# Patient Record
Sex: FEMALE | Race: WHITE | Employment: FULL TIME | URBAN - METROPOLITAN AREA
[De-identification: names, ages, dates, MRNs, and addresses within clinical notes are randomized per-mention and may not be internally consistent; named-entity substitution may affect disease eponyms.]

---

## 2017-02-09 ENCOUNTER — OFFICE VISIT (OUTPATIENT)
Dept: FAMILY MEDICINE CLINIC | Facility: CLINIC | Age: 32
End: 2017-02-09

## 2017-02-09 VITALS
BODY MASS INDEX: 21.85 KG/M2 | HEART RATE: 88 BPM | HEIGHT: 64 IN | SYSTOLIC BLOOD PRESSURE: 110 MMHG | WEIGHT: 128 LBS | DIASTOLIC BLOOD PRESSURE: 70 MMHG

## 2017-02-09 DIAGNOSIS — Z00.00 WELL FEMALE EXAM WITHOUT GYNECOLOGICAL EXAM: Primary | ICD-10-CM

## 2017-02-09 PROCEDURE — 99385 PREV VISIT NEW AGE 18-39: CPT | Performed by: FAMILY MEDICINE

## 2017-02-09 NOTE — PROGRESS NOTES
HPI:   Leonardo Becerra is a 29year old female who presents for a complete physical exam. Symptoms: no periods is breast feeding. .   Abnormal pap no abnormal  Sexually active libido down    Last colonoscopy FH GF colon cancer  Last mammogram No FH breast or ov pain  CARDIOVASCULAR: denies chest pain or tightness on exertion: no edema  VASCULAR: denies leg cramps  GI: denies abdominal pain, bowel movement changes, blood in stool  : denies urinary problems, vaginal discharge or discomfort,   MUSCULOSKELETAL: den explained. Health maintenance guidance given including vision and dental exams, vitamin D 1,000 iu daily with calcium 1,500 mg daily. Lifestyle guidance provided recommended low fat diet and aerobic exercise 30 minutes 3-4 times weekly.   The patient indic

## 2017-02-09 NOTE — PATIENT INSTRUCTIONS
SCHEDULING [facility] APPOINTMENTS ONLINE    Lab appointments can now be scheduled online at [email or web address]    · Go to [email or web address]  · In Search type Lab  · Click \"Lab services\"  · Click \"Schedule Your Test Online\"  · Follow the prompts  · If you should have a mammogram.   Pap test: at least every 3 years if you have ever had sex and have not had your uterus removed.  Your healthcare provider may recommend more frequent screening if you have had any abnormalities in the past or if you have an increa do not get regular exercise). Osteoporosis is a disease that thins and weakens bones to the point where they break easily. Hearing test: if you are 72 or older   Vision test: if you are 72 or older.    Remember, these are the minimum recommendations for r hepatitis shot and for all adults who are at risk of infection.  This includes, for example, women who have more than 1 sex partner or whose partner has more than 1 partner, or who have a sexually transmitted infection, abuse IV drugs, or plan to travel whe partners. Hormone use: During or after menopause, discuss the risks and benefits of use of estrogen and progesterone replacement with your healthcare provider.    Osteoporosis prevention:  Advise 1,500 mg of calcium with 1,000 iu of vitamin D daily and da

## 2017-03-02 ENCOUNTER — OFFICE VISIT (OUTPATIENT)
Dept: FAMILY MEDICINE CLINIC | Facility: CLINIC | Age: 32
End: 2017-03-02

## 2017-03-02 ENCOUNTER — TELEPHONE (OUTPATIENT)
Dept: FAMILY MEDICINE CLINIC | Facility: CLINIC | Age: 32
End: 2017-03-02

## 2017-03-02 VITALS
HEART RATE: 80 BPM | BODY MASS INDEX: 21.85 KG/M2 | OXYGEN SATURATION: 98 % | SYSTOLIC BLOOD PRESSURE: 114 MMHG | RESPIRATION RATE: 20 BRPM | HEIGHT: 64 IN | TEMPERATURE: 98 F | DIASTOLIC BLOOD PRESSURE: 72 MMHG | WEIGHT: 128 LBS

## 2017-03-02 DIAGNOSIS — L30.9 DERMATITIS: ICD-10-CM

## 2017-03-02 PROCEDURE — 99213 OFFICE O/P EST LOW 20 MIN: CPT | Performed by: NURSE PRACTITIONER

## 2017-03-02 NOTE — PROGRESS NOTES
CHIEF COMPLAINT:   Patient presents with:  Derm Problem: ear lobes and face feel itchy and red x 5 days, lice exposure 2 weeks ago. HPI:   Radha Long is a 29year old female who presents for evaluation of a rash.   Per patient rash started in HEENT: Denies rhinorrhea, edema of the lips or swelling of throat. CARDIOVASCULAR: Denies chest pains or palpitations. LUNGS: Denies shortness of breath with exertion or rest. No cough or wheezing. LYMPH: Denies enlargement of the lymph nodes.   MUSC/SKE Hortencia García is a 29year old female who presents for evaluation of a rash. Findings are consistent with:    ASSESSMENT:  Dermatitis    PLAN: Patiently is currently breast feeding. OTC Meds and instructions as listed below.   Comfort measures as described Atopic dermatitis symptoms can appear anywhere on the body. But, in most cases, they vary based on the patient’s age. In infants, irritation appears frequently on the cheeks, chin, near the mouth, and under the eyelids.  In children ages 3 through 8, skin

## 2017-03-02 NOTE — PATIENT INSTRUCTIONS
Try Claritin for itch as packet insert  Look for eczema shampoos for kids, check label. May try selsium blue shampoo weekly. Can pump and dump the day used. May try emollient lotions to base of scalp, ear lobes and under eyes.   Hydrocortisone 1 % to To pinpoint what causes atopic dermatitis to flare, keep a list of factors that seem to affect your skin. Start by filling in the spaces below. Then, keep writing them down in a notebook or diary. The factors that affect each person vary.  So, keep your own

## 2017-03-02 NOTE — TELEPHONE ENCOUNTER
Pt called and said she has extreme itching on her ears and face. She would like to come in but [facility] is booked. Is there something she can take to help the itching?

## 2017-03-02 NOTE — TELEPHONE ENCOUNTER
Patient advised that she could use benadryl or a hydrocortisone cream for the itching.   Did recommend that patient be seen at one of our walk-in clinics for evaluation

## 2017-03-21 ENCOUNTER — OFFICE VISIT (OUTPATIENT)
Dept: FAMILY MEDICINE CLINIC | Facility: CLINIC | Age: 32
End: 2017-03-21

## 2017-03-21 VITALS
SYSTOLIC BLOOD PRESSURE: 118 MMHG | WEIGHT: 121 LBS | BODY MASS INDEX: 20.66 KG/M2 | HEART RATE: 84 BPM | TEMPERATURE: 99 F | HEIGHT: 64 IN | DIASTOLIC BLOOD PRESSURE: 80 MMHG

## 2017-03-21 DIAGNOSIS — N89.8 VAGINAL DISCHARGE: ICD-10-CM

## 2017-03-21 DIAGNOSIS — R23.8 EASY BRUISING: Primary | ICD-10-CM

## 2017-03-21 LAB
CLUE CELL EXAM: NEGATIVE
MOVING ELEMENTS: NEGATIVE
WBC: NEGATIVE
WHIFF TEST: NEGATIVE

## 2017-03-21 PROCEDURE — 99214 OFFICE O/P EST MOD 30 MIN: CPT | Performed by: FAMILY MEDICINE

## 2017-03-21 PROCEDURE — 87210 SMEAR WET MOUNT SALINE/INK: CPT | Performed by: FAMILY MEDICINE

## 2017-03-21 RX ORDER — FLUCONAZOLE 150 MG/1
150 TABLET ORAL ONCE
Qty: 2 TABLET | Refills: 0 | Status: SHIPPED | OUTPATIENT
Start: 2017-03-21 | End: 2017-03-21

## 2017-03-21 NOTE — PROGRESS NOTES
Laura Medina is a 29year old female. HPI:   Has had a yeast infection for the past month. Has treated twice with OTC Monostat. Has been prone to yeast infections in the past. Started taking probiotics but is not consistent with taking.  Also feels like p normal to palpation  LUNGS: clear to auscultation no rales, rhonchi or wheezes  CARDIO: RRR without murmur  GI: Normal bowel sounds ×4 quadrant, no hepatosplenomegaly or masses, and no tenderness   external labia erythema no lesions vagina also erythema are consistent with candidiasis. Probiotic daily, Diflucan 150 mg 1 p.o. today repeat in 3 days. Advised to keep area dry use blow dryer after shower.   Be consistent with probiotic.  - SURESWAB(R), VAGINOSIS/VAGINITIS PLUS [[id number]][Q]  Discussed about the

## 2017-03-21 NOTE — PATIENT INSTRUCTIONS
SCHEDULING [facility] APPOINTMENTS ONLINE    Lab appointments can now be scheduled online at [email or web address]    · Go to [email or web address]  · In Search type Lab  · Click \"Lab services\"  · Click \"Schedule Your Test Online\"  · Follow the prompts  · If you Debra Franklin even more effective. Visit your healthcare provider if you have difficulties doing Kegel exercises. Helpful hints  Here are some tips to follow:  · Do your [place] as often as you can. The more you do them, the faster you’ll feel the results.   · Monty Reach since the cream may weaken them. · Avoid intercourse if advised by your healthcare provider.     Should I treat a yeast infection myself? Discuss with your healthcare provider whether you should use over-the-counter medicines to treat a yeast infection.

## 2017-03-22 PROBLEM — R23.8 EASY BRUISING: Status: ACTIVE | Noted: 2017-03-22

## 2017-03-22 PROBLEM — R23.3 EASY BRUISING: Status: ACTIVE | Noted: 2017-03-22

## 2017-03-24 ENCOUNTER — TELEPHONE (OUTPATIENT)
Dept: FAMILY MEDICINE CLINIC | Facility: CLINIC | Age: 32
End: 2017-03-24

## 2017-03-24 LAB
ATOPOBIUM VAGINAE: NOT DETECTED LOG CELLS/ML
C. ALBICANS, DNA: NOT DETECTED
C. GLABRATA, DNA: NOT DETECTED
C. PARAPSILOSIS, DNA: NOT DETECTED
C. TROPICALIS, DNA: NOT DETECTED
CHLAMYDIA TRACHOMATIS$RNA, TMA: NOT DETECTED
GARDNERELLA VAGINALIS: NOT DETECTED LOG CELLS/ML
LACTOBACILLUS SPECIES: 5.1 LOG CELLS/ML
MEGASPHAERA SPECIES: NOT DETECTED LOG CELLS/ML
NEISSERIA GONORRHOEAE$RNA, TMA: NOT DETECTED
SURESWAB(R) TRICHOMONAS$VAGINALIS RNA, QL TMA: NOT DETECTED

## 2017-03-24 NOTE — TELEPHONE ENCOUNTER
----- Message from Tequila Acosta PA-C sent at 3/24/2017  9:47 AM CDT -----  Vaginosis panel is negative

## 2017-03-24 NOTE — TELEPHONE ENCOUNTER
lmom for pt (secure ) with results. Asked pt after reviewing message to call office with any questions.

## 2017-06-20 ENCOUNTER — OFFICE VISIT (OUTPATIENT)
Dept: FAMILY MEDICINE CLINIC | Facility: CLINIC | Age: 32
End: 2017-06-20

## 2017-06-20 VITALS
SYSTOLIC BLOOD PRESSURE: 112 MMHG | WEIGHT: 115.19 LBS | DIASTOLIC BLOOD PRESSURE: 68 MMHG | HEART RATE: 84 BPM | BODY MASS INDEX: 20 KG/M2

## 2017-06-20 DIAGNOSIS — M62.08 DIASTASIS RECTI: ICD-10-CM

## 2017-06-20 DIAGNOSIS — Z63.79 STRESSFUL LIFE EVENT AFFECTING FAMILY: ICD-10-CM

## 2017-06-20 DIAGNOSIS — Z30.011 ENCOUNTER FOR INITIAL PRESCRIPTION OF CONTRACEPTIVE PILLS: Primary | ICD-10-CM

## 2017-06-20 PROBLEM — R23.8 EASY BRUISING: Status: RESOLVED | Noted: 2017-03-22 | Resolved: 2017-06-20

## 2017-06-20 PROBLEM — Z00.00 WELL FEMALE EXAM WITHOUT GYNECOLOGICAL EXAM: Status: RESOLVED | Noted: 2017-02-09 | Resolved: 2017-06-20

## 2017-06-20 PROBLEM — R23.3 EASY BRUISING: Status: RESOLVED | Noted: 2017-03-22 | Resolved: 2017-06-20

## 2017-06-20 PROCEDURE — 99213 OFFICE O/P EST LOW 20 MIN: CPT | Performed by: FAMILY MEDICINE

## 2017-06-20 RX ORDER — FLUCONAZOLE 150 MG/1
TABLET ORAL
COMMUNITY
Start: 2017-06-07 | End: 2017-06-20 | Stop reason: ALTCHOICE

## 2017-06-20 RX ORDER — ACETAMINOPHEN AND CODEINE PHOSPHATE 120; 12 MG/5ML; MG/5ML
SOLUTION ORAL
Qty: 28 TABLET | Refills: 11 | Status: SHIPPED | OUTPATIENT
Start: 2017-06-20 | End: 2017-12-05

## 2017-06-20 NOTE — PROGRESS NOTES
Yarelis Ponce is a 29year old female. HPI:   Period just finished is nursing till baby is one years old. No prior issue with birth control uses. No history of clots but thinks her sister had a clot in her leg. No Breast issues is nursing.   Going throu heartburn  NEURO: denies headaches  Musculoskeletal: No motor deficits  EXAM:   /68 mmHg  Pulse 84  Wt 115 lb 3.2 oz  GENERAL: well developed, well nourished,in no apparent distress  SKIN: no rashes,no suspicious lesions  NECK: supple,no adenopathy,

## 2017-06-20 NOTE — PATIENT INSTRUCTIONS
SCHEDULING [facility] APPOINTMENTS ONLINE    Lab appointments can now be scheduled online at [email or web address]    · Go to [email or web address]  · In Search type Lab  · Click \"Lab services\"  · Click \"Schedule Your Test Online\"  · Follow the prompts  · If you swelling  · headache  · irritability  · nausea  · weight gain or loss  What may interact with this medicine?   Do not take this medicine with any of the following medications:  · amprenavir or fosamprenavir  · bosentan  This medicine may also interact with medicine? Visit your doctor or health care professional for regular checks on your progress. You will need a regular breast and pelvic exam and Pap smear while on this medicine.   Use an additional method of birth control during the first cycle that you ta health care provider to prevent pregnancy. The pill  The birth control pill is taken daily. It contains hormones that stop a woman’s body from releasing an egg each month.   Other hormones  Hormones that stop a woman’s egg from being released each month ca sexually transmitted infections (STIs). To protect against STIs, always use a latex condom. If you are allergic to latex, a non-latex condom may provide some protection.    Date Last Reviewed: 5/10/2015  © 1915-1902 [address]

## 2017-09-26 ENCOUNTER — OFFICE VISIT (OUTPATIENT)
Dept: FAMILY MEDICINE CLINIC | Facility: CLINIC | Age: 32
End: 2017-09-26

## 2017-09-26 VITALS
BODY MASS INDEX: 19.18 KG/M2 | HEART RATE: 78 BPM | DIASTOLIC BLOOD PRESSURE: 60 MMHG | OXYGEN SATURATION: 99 % | RESPIRATION RATE: 16 BRPM | HEIGHT: 64 IN | SYSTOLIC BLOOD PRESSURE: 106 MMHG | WEIGHT: 112.38 LBS | TEMPERATURE: 98 F

## 2017-09-26 DIAGNOSIS — Z00.00 WELL ADULT EXAM: Primary | ICD-10-CM

## 2017-09-26 DIAGNOSIS — Z11.1 SCREENING FOR TUBERCULOSIS: ICD-10-CM

## 2017-09-26 PROBLEM — Z30.011 ENCOUNTER FOR INITIAL PRESCRIPTION OF CONTRACEPTIVE PILLS: Status: RESOLVED | Noted: 2017-06-20 | Resolved: 2017-09-26

## 2017-09-26 PROCEDURE — 86580 TB INTRADERMAL TEST: CPT | Performed by: FAMILY MEDICINE

## 2017-09-26 PROCEDURE — 99395 PREV VISIT EST AGE 18-39: CPT | Performed by: FAMILY MEDICINE

## 2017-09-26 NOTE — PROGRESS NOTES
HPI:   Yarelis Ponce is a 29year old female who presents for a complete physical exam. Symptoms: periods are regular.  Still nursing at night   Abnormal pap done 1 year ago with gyne normal per patient  Sexually active yes   Sleep or emotional difficulty n Onset   • Hypertension Father    • Heart Attack Father      lates 42's - stent   • Cancer Paternal Grandfather      Lung   • Colon Cancer Maternal Grandfather    • Diabetes Maternal Grandfather    • Cancer Maternal Grandmother      Lung      Social History supple,no adenopathy,no carotid bruits  CHEST: no chest tenderness  LUNGS: clear to auscultation bilateral, no rales, rhonchi or wheezing  CARDIO: RRR without murmur normal S1S2  ABD:  normal bowel sounds,soft, non tender, no masses, HSM or tenderness  MUS

## 2017-12-05 ENCOUNTER — OFFICE VISIT (OUTPATIENT)
Dept: FAMILY MEDICINE CLINIC | Facility: CLINIC | Age: 32
End: 2017-12-05

## 2017-12-05 VITALS
DIASTOLIC BLOOD PRESSURE: 62 MMHG | HEART RATE: 84 BPM | WEIGHT: 110 LBS | BODY MASS INDEX: 18.78 KG/M2 | HEIGHT: 64.25 IN | SYSTOLIC BLOOD PRESSURE: 90 MMHG

## 2017-12-05 DIAGNOSIS — Z12.4 SCREENING FOR MALIGNANT NEOPLASM OF CERVIX: ICD-10-CM

## 2017-12-05 DIAGNOSIS — Z30.8 ENCOUNTER FOR OTHER CONTRACEPTIVE MANAGEMENT: ICD-10-CM

## 2017-12-05 DIAGNOSIS — N89.8 VAGINAL ITCHING: ICD-10-CM

## 2017-12-05 DIAGNOSIS — L50.9 HIVES: Primary | ICD-10-CM

## 2017-12-05 DIAGNOSIS — Z91.018 ALLERGY TO MANGO FRUIT: ICD-10-CM

## 2017-12-05 PROBLEM — Z63.79 STRESSFUL LIFE EVENT AFFECTING FAMILY: Status: RESOLVED | Noted: 2017-06-20 | Resolved: 2017-12-05

## 2017-12-05 PROCEDURE — 99214 OFFICE O/P EST MOD 30 MIN: CPT | Performed by: FAMILY MEDICINE

## 2017-12-05 PROCEDURE — 99000 SPECIMEN HANDLING OFFICE-LAB: CPT | Performed by: FAMILY MEDICINE

## 2017-12-05 PROCEDURE — 87625 HPV TYPES 16 & 18 ONLY: CPT | Performed by: FAMILY MEDICINE

## 2017-12-05 PROCEDURE — 87624 HPV HI-RISK TYP POOLED RSLT: CPT | Performed by: FAMILY MEDICINE

## 2017-12-05 PROCEDURE — 88175 CYTOPATH C/V AUTO FLUID REDO: CPT | Performed by: FAMILY MEDICINE

## 2017-12-05 RX ORDER — NORGESTIMATE AND ETHINYL ESTRADIOL 7DAYSX3 LO
KIT ORAL
Qty: 28 TABLET | Refills: 11 | Status: SHIPPED | OUTPATIENT
Start: 2017-12-05 | End: 2018-04-03

## 2017-12-05 RX ORDER — EPINEPHRINE 0.15 MG/.3ML
0.15 INJECTION INTRAMUSCULAR AS NEEDED
Qty: 1 EACH | Refills: 1 | Status: SHIPPED | OUTPATIENT
Start: 2017-12-05 | End: 2018-04-03

## 2017-12-05 NOTE — PROGRESS NOTES
Bell Lees is a 29year old female.   HPI:   #1 Pap smear  Had one last year but would like repeat done this year already had physical done in September  Normal in the past    #2 allergy reaction to [facility]  Denies any swallowing swelling issues  Does stat pain on exertion  GI: denies abdominal pain and denies heartburn  NEURO: denies headaches  Musculoskeletal: No motor deficits   external itching systemic from karlie exposure  EXAM:   BP 90/62 (BP Location: Right arm, Patient Position: Sitting, Cuff Size: muscle as needed for Anaphylaxis. Imaging & Consults:  None  1. Hives   Benadryl 50 mg at night and Claritin 10 mg in the morning. Cool compresses    2. Vaginal itching  Cool compresses and over-the-counter Cortaid as needed    3.  Allergy to man

## 2017-12-14 ENCOUNTER — TELEPHONE (OUTPATIENT)
Dept: FAMILY MEDICINE CLINIC | Facility: CLINIC | Age: 32
End: 2017-12-14

## 2017-12-14 NOTE — TELEPHONE ENCOUNTER
Pt called back and discussed test results. Pt verbalized understanding.   Number given to [facility]

## 2017-12-14 NOTE — TELEPHONE ENCOUNTER
----- Message from Mitzy Betancourt PA-C sent at 12/7/2017  7:14 PM CST -----  Low-grade squamous intraepithelial lesion HPV refer for colposcopy

## 2017-12-14 NOTE — TELEPHONE ENCOUNTER
----- Message from Debbie Bass PA-C sent at 12/7/2017  7:14 PM CST -----  Low-grade squamous intraepithelial lesion HPV refer for colposcopy

## 2018-04-03 ENCOUNTER — OFFICE VISIT (OUTPATIENT)
Dept: FAMILY MEDICINE CLINIC | Facility: CLINIC | Age: 33
End: 2018-04-03

## 2018-04-03 VITALS
HEART RATE: 80 BPM | WEIGHT: 118 LBS | SYSTOLIC BLOOD PRESSURE: 100 MMHG | BODY MASS INDEX: 19.9 KG/M2 | HEIGHT: 64.41 IN | DIASTOLIC BLOOD PRESSURE: 78 MMHG

## 2018-04-03 DIAGNOSIS — N92.1 IRREGULAR INTERMENSTRUAL BLEEDING: ICD-10-CM

## 2018-04-03 DIAGNOSIS — L50.9 HIVES: Primary | ICD-10-CM

## 2018-04-03 PROCEDURE — 99213 OFFICE O/P EST LOW 20 MIN: CPT | Performed by: FAMILY MEDICINE

## 2018-04-03 RX ORDER — DESOGESTREL AND ETHINYL ESTRADIOL 0.15-0.03
1 KIT ORAL DAILY
Qty: 3 PACKAGE | Refills: 3 | Status: SHIPPED | OUTPATIENT
Start: 2018-04-03 | End: 2019-02-28

## 2018-04-03 RX ORDER — EPINEPHRINE 0.3 MG/.3ML
0.3 INJECTION SUBCUTANEOUS ONCE
Qty: 1 EACH | Refills: 1 | Status: SHIPPED | OUTPATIENT
Start: 2018-04-03 | End: 2018-04-03

## 2018-04-03 NOTE — PROGRESS NOTES
Caitlin Klein is a 34year old female. HPI:    Week before menstrual cycle gets a full week of bleeding on the present birth control pills Ortho Tri-Cyclen yury.   Sexually active with the same partner states that she would like to change birth control in or Cuff Size: adult)   Pulse 80   Ht 64.41\"   Wt 118 lb   BMI 20.00 kg/m²   GENERAL: well developed, well nourished,in no apparent distress  SKIN: no rashes,no suspicious lesions  EYES: PERRLA, EOM intact, sclera clear without injection  NECK: supple,no felton getting a colposcopy is patient prefers to repeat Pap and then get colposcopy if needed. Heidi Rucker

## 2018-09-15 ENCOUNTER — OFFICE VISIT (OUTPATIENT)
Dept: FAMILY MEDICINE CLINIC | Facility: CLINIC | Age: 33
End: 2018-09-15
Payer: COMMERCIAL

## 2018-09-15 VITALS
SYSTOLIC BLOOD PRESSURE: 108 MMHG | HEIGHT: 64.17 IN | DIASTOLIC BLOOD PRESSURE: 80 MMHG | WEIGHT: 129 LBS | BODY MASS INDEX: 22.02 KG/M2 | HEART RATE: 76 BPM

## 2018-09-15 DIAGNOSIS — R87.610 ASCUS OF CERVIX WITH NEGATIVE HIGH RISK HPV: ICD-10-CM

## 2018-09-15 DIAGNOSIS — Z12.4 SCREENING FOR MALIGNANT NEOPLASM OF CERVIX: ICD-10-CM

## 2018-09-15 DIAGNOSIS — Z01.419 WELL FEMALE EXAM WITH ROUTINE GYNECOLOGICAL EXAM: Primary | ICD-10-CM

## 2018-09-15 DIAGNOSIS — Z00.00 LABORATORY EXAMINATION ORDERED AS PART OF A ROUTINE GENERAL MEDICAL EXAMINATION: ICD-10-CM

## 2018-09-15 PROCEDURE — 99395 PREV VISIT EST AGE 18-39: CPT | Performed by: FAMILY MEDICINE

## 2018-09-15 PROCEDURE — 88175 CYTOPATH C/V AUTO FLUID REDO: CPT | Performed by: FAMILY MEDICINE

## 2018-09-15 PROCEDURE — 87624 HPV HI-RISK TYP POOLED RSLT: CPT | Performed by: FAMILY MEDICINE

## 2018-09-15 NOTE — PROGRESS NOTES
HPI:   Florentino Valdivia is a 34year old female who presents for a complete physical exam. Symptoms: periods are regular, menstraul cup used did get irritation. . Patient complains of irritation inside since this AM due to using menstrual cup and states vagin [place]  2016: SCREENING PAP SMEAR BY PHYS   Family History   Problem Relation Age of Onset   • Hypertension Father    • Heart Attack Father         lates 42's - stent   • Cancer Paternal Grandfather         Lung   • Cancer Maternal Grandmother         Lung   • normocephalic,ears, nose and throat are normal  EYES: PERRLA, EOMI, sclera, conjunctiva are clear  NECK: supple,no adenopathy,no carotid bruits  CHEST: no chest tenderness  BREAST: symmetrical, no suspicious mass, no nipple dimpling or discharge.   LUNGS: c Continue with OCP has refills until March.   Okay to receive refills for 1 year from the stay  Use, risks and benefits of hormonal contraception discussed including blood clot that can go to the lungs, heart or brain and cause Heart Attack, Stroke and ryder

## 2018-09-16 PROBLEM — N89.8 VAGINAL ITCHING: Status: RESOLVED | Noted: 2017-12-05 | Resolved: 2018-09-16

## 2018-09-16 PROBLEM — L50.9 HIVES: Status: RESOLVED | Noted: 2017-12-05 | Resolved: 2018-09-16

## 2018-09-16 LAB — HPV I/H RISK 1 DNA SPEC QL NAA+PROBE: NEGATIVE

## 2018-09-19 ENCOUNTER — TELEPHONE (OUTPATIENT)
Dept: FAMILY MEDICINE CLINIC | Facility: CLINIC | Age: 33
End: 2018-09-19

## 2018-09-19 NOTE — TELEPHONE ENCOUNTER
A message was left on the patient's confidential voicemail with Rhiannon's result note comments read verbatim. The patient was instructed to call back with any questions. complains of pain/discomfort

## 2018-09-19 NOTE — TELEPHONE ENCOUNTER
----- Message from Fanny Sarah PA-C sent at 9/18/2018  8:10 PM CDT -----  Negative pap with yeast infection, if symptoms use OTC monistat. HPV is negative. Pap repeat in 3 years.

## 2018-09-19 NOTE — PROGRESS NOTES
Negative pap with yeast infection, if symptoms use OTC monistat. HPV is negative. Pap repeat in 3 years.

## 2018-10-18 ENCOUNTER — LAB ENCOUNTER (OUTPATIENT)
Dept: LAB | Facility: HOSPITAL | Age: 33
End: 2018-10-18
Attending: ALLERGY & IMMUNOLOGY
Payer: COMMERCIAL

## 2018-10-18 DIAGNOSIS — T78.00XA ANAPHYLACTIC SHOCK DUE TO FOOD: ICD-10-CM

## 2018-10-18 DIAGNOSIS — L50.3 DERMATOGRAPHIC URTICARIA: ICD-10-CM

## 2018-10-18 DIAGNOSIS — J30.9 ALLERGIC RHINITIS DUE TO ALLERGEN: Primary | ICD-10-CM

## 2018-10-18 PROCEDURE — 86003 ALLG SPEC IGE CRUDE XTRC EA: CPT

## 2019-02-28 DIAGNOSIS — N92.1 IRREGULAR INTERMENSTRUAL BLEEDING: ICD-10-CM

## 2019-02-28 RX ORDER — DESOGESTREL AND ETHINYL ESTRADIOL 0.15-0.03
KIT ORAL
Qty: 84 TABLET | Refills: 1 | Status: SHIPPED | OUTPATIENT
Start: 2019-02-28 | End: 2019-08-05

## 2019-08-05 DIAGNOSIS — N92.1 IRREGULAR INTERMENSTRUAL BLEEDING: ICD-10-CM

## 2019-08-05 RX ORDER — DESOGESTREL AND ETHINYL ESTRADIOL 0.15-0.03
KIT ORAL
Qty: 84 TABLET | Refills: 1 | Status: SHIPPED | OUTPATIENT
Start: 2019-08-05 | End: 2019-09-21 | Stop reason: SINTOL

## 2019-09-21 ENCOUNTER — OFFICE VISIT (OUTPATIENT)
Dept: FAMILY MEDICINE CLINIC | Facility: CLINIC | Age: 34
End: 2019-09-21
Payer: COMMERCIAL

## 2019-09-21 VITALS
BODY MASS INDEX: 21.93 KG/M2 | WEIGHT: 130 LBS | HEIGHT: 64.57 IN | HEART RATE: 84 BPM | SYSTOLIC BLOOD PRESSURE: 100 MMHG | DIASTOLIC BLOOD PRESSURE: 78 MMHG | TEMPERATURE: 98 F

## 2019-09-21 DIAGNOSIS — Z12.4 SCREENING FOR MALIGNANT NEOPLASM OF CERVIX: ICD-10-CM

## 2019-09-21 DIAGNOSIS — Z00.00 LABORATORY TESTS ORDERED AS PART OF A COMPLETE PHYSICAL EXAM (CPE): ICD-10-CM

## 2019-09-21 DIAGNOSIS — Z01.419 WELL FEMALE EXAM WITH ROUTINE GYNECOLOGICAL EXAM: Primary | ICD-10-CM

## 2019-09-21 PROBLEM — Z91.018: Status: RESOLVED | Noted: 2017-12-05 | Resolved: 2019-09-21

## 2019-09-21 PROCEDURE — 99395 PREV VISIT EST AGE 18-39: CPT | Performed by: FAMILY MEDICINE

## 2019-09-21 PROCEDURE — 88175 CYTOPATH C/V AUTO FLUID REDO: CPT | Performed by: FAMILY MEDICINE

## 2019-09-21 PROCEDURE — 90471 IMMUNIZATION ADMIN: CPT | Performed by: FAMILY MEDICINE

## 2019-09-21 PROCEDURE — 87624 HPV HI-RISK TYP POOLED RSLT: CPT | Performed by: FAMILY MEDICINE

## 2019-09-21 PROCEDURE — 99000 SPECIMEN HANDLING OFFICE-LAB: CPT | Performed by: FAMILY MEDICINE

## 2019-09-21 PROCEDURE — 90686 IIV4 VACC NO PRSV 0.5 ML IM: CPT | Performed by: FAMILY MEDICINE

## 2019-09-21 NOTE — PROGRESS NOTES
HPI:   Dave Lee is a 30year old female who presents for a complete physical exam.   Symptoms: periods are regular, Stopped pill this month  9/15/18 Pap and HPV negative will do pap 1 more time if normal then every 3 years  12/17 HPV positive with a Smoker      Smokeless tobacco: Never Used    Alcohol use:  Yes      Alcohol/week: 0.0 standard drinks      Frequency: Monthly or less      Drinks per session: 1 or 2      Binge frequency: Never      Comment: special occasions    Drug use: No    Occ: teacher S1S2  ABD:  normal bowel sounds,soft, non tender, no masses, HSM or tenderness  :external labia, introitus and vagina are normal, normal discharge and normal cervix.   Bimanual exam normal no adnexal masses or cervical motion tenderness, PAP was done    Alie Josiah LIPID PANEL; Future  - TSH+FREE T4; Future    Advised yearly skin exam with dermatology  The patient indicates understanding of these issues and agrees to the plan. The patient is asked to return for complete physical yearly.

## 2019-09-23 LAB — HPV I/H RISK 1 DNA SPEC QL NAA+PROBE: NEGATIVE

## 2019-09-26 LAB — LAST PAP RESULT: NORMAL

## 2019-10-06 ENCOUNTER — OFFICE VISIT (OUTPATIENT)
Dept: FAMILY MEDICINE CLINIC | Facility: CLINIC | Age: 34
End: 2019-10-06
Payer: COMMERCIAL

## 2019-10-06 VITALS
TEMPERATURE: 99 F | DIASTOLIC BLOOD PRESSURE: 70 MMHG | WEIGHT: 130 LBS | BODY MASS INDEX: 21.93 KG/M2 | HEIGHT: 64.57 IN | OXYGEN SATURATION: 98 % | RESPIRATION RATE: 24 BRPM | SYSTOLIC BLOOD PRESSURE: 110 MMHG | HEART RATE: 101 BPM

## 2019-10-06 DIAGNOSIS — J40 BRONCHITIS: Primary | ICD-10-CM

## 2019-10-06 PROCEDURE — 99213 OFFICE O/P EST LOW 20 MIN: CPT | Performed by: NURSE PRACTITIONER

## 2019-10-06 RX ORDER — ALBUTEROL SULFATE 90 UG/1
2 AEROSOL, METERED RESPIRATORY (INHALATION) EVERY 4 HOURS PRN
Qty: 1 INHALER | Refills: 0 | Status: SHIPPED | OUTPATIENT
Start: 2019-10-06 | End: 2019-10-20

## 2019-10-06 RX ORDER — BENZONATATE 200 MG/1
200 CAPSULE ORAL 3 TIMES DAILY PRN
Qty: 20 CAPSULE | Refills: 0 | Status: SHIPPED | OUTPATIENT
Start: 2019-10-06 | End: 2019-10-13

## 2019-10-06 NOTE — PATIENT INSTRUCTIONS
Humidifier in room  Sleep propped  Push fluids  Limit dairy  Mucinex as directed    Viral Bronchitis (Adult)    You have a viral bronchitis. Bronchitis is inflammation and swelling of the lining of the lungs. This is often caused by an infection.  Symptom will help loosen secretions in the nose and lungs. · Over-the-counter cough, cold, and sore-throat medicines will not shorten the length of the illness, but they may help to reduce symptoms. Don't use decongestants if you have high blood pressure.   Follow

## 2019-10-08 NOTE — PROGRESS NOTES
CHIEF COMPLAINT:   Patient presents with:  Cough: wet, s/s for 2 week. OTC meds taken        HPI:   Kerry Hughes is a 30year old female who presents for cough for  2  weeks. Cough started gradually and is described as wet and persistent.  Patient lu normocephalic. TM's clear bilaterally. Nostrils patent, nasal mucosa pink and non-inflamed. No erythema of the throat. NECK: supple, non-tender. LUNGS: Normal respiratory rate. Normal effort. wet cough. no wheezing. No rales or crackles.  No decreased the cough may last much longer. This illness is contagious during the first few days and is spread through the air by coughing and sneezing, or by direct contact (touching the sick person and then touching your own eyes, nose, or mouth).   Most viral illne a pneumococcal vaccine and a yearly flu shot (influenza vaccine).   When to seek medical advice  Call your healthcare provider right away if any of these occur:  · Fever of 100.4°F (38°C) or higher, or as directed by your healthcare provider  · Coughing up

## 2019-10-24 ENCOUNTER — OFFICE VISIT (OUTPATIENT)
Dept: FAMILY MEDICINE CLINIC | Facility: CLINIC | Age: 34
End: 2019-10-24
Payer: COMMERCIAL

## 2019-10-24 VITALS
BODY MASS INDEX: 21.69 KG/M2 | OXYGEN SATURATION: 99 % | SYSTOLIC BLOOD PRESSURE: 122 MMHG | RESPIRATION RATE: 20 BRPM | TEMPERATURE: 98 F | HEART RATE: 89 BPM | WEIGHT: 128.63 LBS | HEIGHT: 64.57 IN | DIASTOLIC BLOOD PRESSURE: 68 MMHG

## 2019-10-24 DIAGNOSIS — R30.0 DYSURIA: Primary | ICD-10-CM

## 2019-10-24 PROCEDURE — 81003 URINALYSIS AUTO W/O SCOPE: CPT | Performed by: NURSE PRACTITIONER

## 2019-10-24 PROCEDURE — 87186 SC STD MICRODIL/AGAR DIL: CPT | Performed by: NURSE PRACTITIONER

## 2019-10-24 PROCEDURE — 99213 OFFICE O/P EST LOW 20 MIN: CPT | Performed by: NURSE PRACTITIONER

## 2019-10-24 PROCEDURE — 87086 URINE CULTURE/COLONY COUNT: CPT | Performed by: NURSE PRACTITIONER

## 2019-10-24 PROCEDURE — 87077 CULTURE AEROBIC IDENTIFY: CPT | Performed by: NURSE PRACTITIONER

## 2019-10-24 RX ORDER — NITROFURANTOIN 25; 75 MG/1; MG/1
100 CAPSULE ORAL 2 TIMES DAILY
Qty: 14 CAPSULE | Refills: 0 | Status: SHIPPED | OUTPATIENT
Start: 2019-10-24 | End: 2019-10-31

## 2019-10-24 NOTE — PATIENT INSTRUCTIONS
· Complete full course of antibiotics. · Over the counter Tylenol (acetaminophen) or Advil/Motrin (ibuprofen) can be taken according to package instructions as needed for pain/discomfort. · Follow up in 2-3 days if symptoms do not improve or worsen.     ·

## 2019-10-24 NOTE — PROGRESS NOTES
CHIEF COMPLAINT:   Patient presents with:  Urinary Symptoms (urologic): pain, blood in urine first time in the am, frequent urination,x2 weeks      HPI:   Chely Pak is a 30year old female who presents with symptoms of UTI.  Complaining of urinary fr 10/24/19  5:15 PM   Result Value Ref Range    Glucose Urine negative mg/dL    Bilirubin negative Negative    Ketones, UA 40 Negative mg/dL    Spec Gravity 1.020 1.005 - 1.030    Blood Urine small Negative    PH Urine 6.0 4.5 - 8.0    Protein Urine negative • Keep the area around your vagina clean. • Wipe from front to back after you go to the bathroom. • Gently wash the area around your vagina when you bathe or shower. • Wear cotton underwear. • Use pantyhose with cotton crotches.    • Avoid tight c

## 2019-11-02 ENCOUNTER — OFFICE VISIT (OUTPATIENT)
Dept: FAMILY MEDICINE CLINIC | Facility: CLINIC | Age: 34
End: 2019-11-02
Payer: COMMERCIAL

## 2019-11-02 VITALS
RESPIRATION RATE: 16 BRPM | SYSTOLIC BLOOD PRESSURE: 104 MMHG | HEART RATE: 73 BPM | OXYGEN SATURATION: 99 % | BODY MASS INDEX: 22.02 KG/M2 | WEIGHT: 129 LBS | TEMPERATURE: 98 F | DIASTOLIC BLOOD PRESSURE: 66 MMHG | HEIGHT: 64 IN

## 2019-11-02 DIAGNOSIS — H10.32 ACUTE CONJUNCTIVITIS OF LEFT EYE, UNSPECIFIED ACUTE CONJUNCTIVITIS TYPE: Primary | ICD-10-CM

## 2019-11-02 PROCEDURE — 99213 OFFICE O/P EST LOW 20 MIN: CPT | Performed by: NURSE PRACTITIONER

## 2019-11-02 RX ORDER — OFLOXACIN 3 MG/ML
SOLUTION/ DROPS OPHTHALMIC
Qty: 1 BOTTLE | Refills: 0 | Status: SHIPPED | OUTPATIENT
Start: 2019-11-02 | End: 2019-12-23 | Stop reason: ALTCHOICE

## 2019-11-02 NOTE — PROGRESS NOTES
CHIEF COMPLAINT:   Patient presents with:  Eye Problem: left eye crust and redness x this morning       HPI:   Colonel Cesar is a 30year old female who presents with chief complaint of \"pink eye\". Symptoms began this morning.   Patient reports mild left GI: denies N/V/C or abdominal pain  NEURO: denies headaches     EXAM:   /66 (BP Location: Left arm, Patient Position: Sitting, Cuff Size: adult)   Pulse 73   Temp 98 °F (36.7 °C) (Oral)   Resp 16   Ht 64\"   Wt 129 lb (58.5 kg)   LMP 10/05/2019 ([facility] The membrane that covers the white part of your eye (the conjunctiva) is inflamed. Inflammation happens when your body responds to an injury, allergic reaction, infection, or illness.  Symptoms of inflammation in the eye may include redness, irritation, itc

## 2019-11-08 ENCOUNTER — HOSPITAL ENCOUNTER (OUTPATIENT)
Age: 34
Discharge: HOME | End: 2019-11-08
Attending: FAMILY MEDICINE
Payer: COMMERCIAL

## 2019-11-08 VITALS
HEART RATE: 85 BPM | OXYGEN SATURATION: 98 % | TEMPERATURE: 98 F | RESPIRATION RATE: 20 BRPM | SYSTOLIC BLOOD PRESSURE: 104 MMHG | DIASTOLIC BLOOD PRESSURE: 74 MMHG

## 2019-11-08 DIAGNOSIS — J02.9 VIRAL PHARYNGITIS: ICD-10-CM

## 2019-11-08 DIAGNOSIS — H10.33 ACUTE CONJUNCTIVITIS OF BOTH EYES, UNSPECIFIED ACUTE CONJUNCTIVITIS TYPE: Primary | ICD-10-CM

## 2019-11-08 PROCEDURE — 87081 CULTURE SCREEN ONLY: CPT | Performed by: PHYSICIAN ASSISTANT

## 2019-11-08 PROCEDURE — 87430 STREP A AG IA: CPT | Performed by: PHYSICIAN ASSISTANT

## 2019-11-08 PROCEDURE — 99214 OFFICE O/P EST MOD 30 MIN: CPT

## 2019-11-08 PROCEDURE — 99204 OFFICE O/P NEW MOD 45 MIN: CPT

## 2019-11-08 RX ORDER — POLYMYXIN B SULFATE AND TRIMETHOPRIM 1; 10000 MG/ML; [USP'U]/ML
1 SOLUTION OPHTHALMIC
Qty: 10 ML | Refills: 0 | Status: SHIPPED | OUTPATIENT
Start: 2019-11-08 | End: 2019-11-13

## 2019-11-08 RX ORDER — KETOROLAC TROMETHAMINE 5 MG/ML
1 SOLUTION OPHTHALMIC 4 TIMES DAILY
Qty: 1 BOTTLE | Refills: 0 | Status: SHIPPED | OUTPATIENT
Start: 2019-11-08 | End: 2019-11-11

## 2019-11-08 NOTE — ED INITIAL ASSESSMENT (HPI)
Bilateral  Eye redness - started Saturday, today woke up with right eye redness. Left eye puffy and red. Denies fever. Pt went to a quick care  Clinic  Prescribed with  Ofloxacin x 7 days now but no improvement.

## 2019-11-10 NOTE — ED NOTES
Message left for the patient to return call for follow up. Patient notified of negative strep culture results.

## 2019-11-21 ENCOUNTER — TELEPHONE (OUTPATIENT)
Dept: FAMILY MEDICINE CLINIC | Facility: CLINIC | Age: 34
End: 2019-11-21

## 2019-11-21 NOTE — TELEPHONE ENCOUNTER
Pt called and wants to know if Viry Olmos can write a note saying she is fit to work. She came in for a CPE in September but she didn't need a form filled out just a line or 2 saying she is fit for work.   It needs to be faxed to [facility] [address]: Belle Garcia

## 2019-12-04 ENCOUNTER — TELEPHONE (OUTPATIENT)
Dept: FAMILY MEDICINE CLINIC | Facility: CLINIC | Age: 34
End: 2019-12-04

## 2019-12-04 NOTE — TELEPHONE ENCOUNTER
Pt states Prozac was discussed at last OV and would like the RX now but wants to know if she needs an appt or will Lino Gray just send that to her [facility]

## 2019-12-23 NOTE — PROGRESS NOTES
Rodolph Koyanagi is a 30year old female. HPI:   Patient is in with a concern of her extreme mood swings approximately 1 week before her menstrual cycle and 1 week after.   Has never been on any antidepressants or antianxiety medications in the past.  Feels less      Drinks per session: 1 or 2      Binge frequency: Never      Comment: special occasions    Drug use: No       REVIEW OF SYSTEMS:   GENERAL HEALTH: feels well otherwise  SKIN: denies any unusual skin lesions or rashes  RESPIRATORY: denies shortness Consults:   NAVIGATOR  1. PMDD (premenstrual dysphoric disorder)  Use, risks and benefits of hormonal contraception discussed including blood clot that can go to the lungs, heart or brain and cause Heart Attack, Stroke and even Death.  These risks are fur

## 2019-12-26 PROBLEM — F32.81 PMDD (PREMENSTRUAL DYSPHORIC DISORDER): Status: ACTIVE | Noted: 2019-12-26

## 2020-01-02 ENCOUNTER — LAB ENCOUNTER (OUTPATIENT)
Dept: LAB | Age: 35
End: 2020-01-02
Attending: FAMILY MEDICINE
Payer: COMMERCIAL

## 2020-01-02 DIAGNOSIS — F32.81 PMDD (PREMENSTRUAL DYSPHORIC DISORDER): ICD-10-CM

## 2020-01-02 DIAGNOSIS — R53.82 CHRONIC FATIGUE: ICD-10-CM

## 2020-01-02 DIAGNOSIS — Z00.00 LABORATORY TESTS ORDERED AS PART OF A COMPLETE PHYSICAL EXAM (CPE): ICD-10-CM

## 2020-01-02 LAB
ALBUMIN SERPL-MCNC: 3.9 G/DL (ref 3.4–5)
ALBUMIN/GLOB SERPL: 1 {RATIO} (ref 1–2)
ALP LIVER SERPL-CCNC: 53 U/L (ref 37–98)
ALT SERPL-CCNC: 16 U/L (ref 13–56)
ANION GAP SERPL CALC-SCNC: 6 MMOL/L (ref 0–18)
AST SERPL-CCNC: 9 U/L (ref 15–37)
BASOPHILS # BLD AUTO: 0.05 X10(3) UL (ref 0–0.2)
BASOPHILS NFR BLD AUTO: 0.7 %
BILIRUB SERPL-MCNC: 0.6 MG/DL (ref 0.1–2)
BUN BLD-MCNC: 13 MG/DL (ref 7–18)
BUN/CREAT SERPL: 14.3 (ref 10–20)
CALCIUM BLD-MCNC: 9.1 MG/DL (ref 8.5–10.1)
CHLORIDE SERPL-SCNC: 105 MMOL/L (ref 98–112)
CHOLEST SMN-MCNC: 215 MG/DL (ref ?–200)
CO2 SERPL-SCNC: 27 MMOL/L (ref 21–32)
CREAT BLD-MCNC: 0.91 MG/DL (ref 0.55–1.02)
DEPRECATED RDW RBC AUTO: 41.7 FL (ref 35.1–46.3)
EOSINOPHIL # BLD AUTO: 0.12 X10(3) UL (ref 0–0.7)
EOSINOPHIL NFR BLD AUTO: 1.7 %
ERYTHROCYTE [DISTWIDTH] IN BLOOD BY AUTOMATED COUNT: 12.3 % (ref 11–15)
GLOBULIN PLAS-MCNC: 3.9 G/DL (ref 2.8–4.4)
GLUCOSE BLD-MCNC: 86 MG/DL (ref 70–99)
HCT VFR BLD AUTO: 41 % (ref 35–48)
HDLC SERPL-MCNC: 73 MG/DL (ref 40–59)
HGB BLD-MCNC: 13.4 G/DL (ref 12–16)
IMM GRANULOCYTES # BLD AUTO: 0.02 X10(3) UL (ref 0–1)
IMM GRANULOCYTES NFR BLD: 0.3 %
LDLC SERPL CALC-MCNC: 121 MG/DL (ref ?–100)
LYMPHOCYTES # BLD AUTO: 2.48 X10(3) UL (ref 1–4)
LYMPHOCYTES NFR BLD AUTO: 35.5 %
M PROTEIN MFR SERPL ELPH: 7.8 G/DL (ref 6.4–8.2)
MCH RBC QN AUTO: 30.4 PG (ref 26–34)
MCHC RBC AUTO-ENTMCNC: 32.7 G/DL (ref 31–37)
MCV RBC AUTO: 93 FL (ref 80–100)
MONOCYTES # BLD AUTO: 0.35 X10(3) UL (ref 0.1–1)
MONOCYTES NFR BLD AUTO: 5 %
NEUTROPHILS # BLD AUTO: 3.96 X10 (3) UL (ref 1.5–7.7)
NEUTROPHILS # BLD AUTO: 3.96 X10(3) UL (ref 1.5–7.7)
NEUTROPHILS NFR BLD AUTO: 56.8 %
NONHDLC SERPL-MCNC: 142 MG/DL (ref ?–130)
OSMOLALITY SERPL CALC.SUM OF ELEC: 285 MOSM/KG (ref 275–295)
PATIENT FASTING Y/N/NP: YES
PATIENT FASTING Y/N/NP: YES
PLATELET # BLD AUTO: 321 10(3)UL (ref 150–450)
POTASSIUM SERPL-SCNC: 4.1 MMOL/L (ref 3.5–5.1)
RBC # BLD AUTO: 4.41 X10(6)UL (ref 3.8–5.3)
SODIUM SERPL-SCNC: 138 MMOL/L (ref 136–145)
T4 FREE SERPL-MCNC: 1 NG/DL (ref 0.8–1.7)
TRIGL SERPL-MCNC: 105 MG/DL (ref 30–149)
TSI SER-ACNC: 1 MIU/ML (ref 0.36–3.74)
VIT B12 SERPL-MCNC: 425 PG/ML (ref 193–986)
VIT D+METAB SERPL-MCNC: 48.4 NG/ML (ref 30–100)
VLDLC SERPL CALC-MCNC: 21 MG/DL (ref 0–30)
WBC # BLD AUTO: 7 X10(3) UL (ref 4–11)

## 2020-01-02 PROCEDURE — 82607 VITAMIN B-12: CPT | Performed by: FAMILY MEDICINE

## 2020-01-02 PROCEDURE — 82306 VITAMIN D 25 HYDROXY: CPT | Performed by: FAMILY MEDICINE

## 2020-01-02 PROCEDURE — 84439 ASSAY OF FREE THYROXINE: CPT | Performed by: FAMILY MEDICINE

## 2020-01-02 PROCEDURE — 80061 LIPID PANEL: CPT | Performed by: FAMILY MEDICINE

## 2020-01-02 PROCEDURE — 36415 COLL VENOUS BLD VENIPUNCTURE: CPT | Performed by: FAMILY MEDICINE

## 2020-01-02 PROCEDURE — 80050 GENERAL HEALTH PANEL: CPT | Performed by: FAMILY MEDICINE

## 2020-01-03 NOTE — PROGRESS NOTES
CBC, vitamin D, B12, CMP and thyroid are all normal.  Lipids with cholesterol and LDL are slightly elevated watch saturated fats and eat more fiber and fish with high omega-3. Repeat lipids in 1 year.   Sent to my chart

## 2020-02-17 PROBLEM — F41.1 GAD (GENERALIZED ANXIETY DISORDER): Status: ACTIVE | Noted: 2020-02-17

## 2020-02-17 PROBLEM — F32.A MILD DEPRESSION: Status: ACTIVE | Noted: 2020-02-17

## 2020-02-17 NOTE — PROGRESS NOTES
Dirk Portillo is a 37year old female. HPI:   Patient is in for follow-up on PMDD mild depression and anxiety. States most of her symptoms were just with her.   But now she does feel them most weekends during the week she feels busy so therefore does not mg/dL    VLDL 21 0 - 30 mg/dL    Non HDL Chol 142 (H) <130 mg/dL    FASTING Yes    COMP METABOLIC PANEL (14)   Result Value Ref Range    Glucose 86 70 - 99 mg/dL    Sodium 138 136 - 145 mmol/L    Potassium 4.1 3.5 - 5.1 mmol/L    Chloride 105 98 - 112 mmol increase to 50 mg 30 tablet 0   • Norgestim-Eth Estrad Triphasic (ORTHO TRI-CYCLEN, 28,) 0.18/0.215/0.25 MG-35 MCG Oral Tab Take 1 tablet by mouth daily. 1 Package 5   • Multivitamin Chewtab, ADULT, Oral Chew Tab Chew 2 tablets by mouth once a week. depression (hcc)  Clarek (generalized anxiety disorder)  Medication management  Hair thinning    No orders of the defined types were placed in this encounter.       Meds & Refills for this Visit:  Requested Prescriptions     Signed Prescriptions Disp Refills

## 2020-02-17 NOTE — PATIENT INSTRUCTIONS
Stop the Prozac for 2 days then start 25 mg of the Zoloft (sertraline) for 2 weeks if tolerated and needed can increase to 50 mg. Buspar 5 mg can take twice daily if needed for anxiety can use on the weekends.

## 2020-03-23 NOTE — TELEPHONE ENCOUNTER
Per Eula Lagunas PA-C, an attempt was made to contact the patient regarding her upcomming appointment with Leslei Jefferson this Friday; however, the patient's voicemail is not set up yet.  If the patient calls back, then she should be asked if she is willing

## 2020-03-24 PROBLEM — F32.5 DEPRESSION, MAJOR, IN REMISSION (HCC): Status: ACTIVE | Noted: 2020-03-24

## 2020-03-24 PROBLEM — F32.5 DEPRESSION, MAJOR, IN REMISSION: Status: ACTIVE | Noted: 2020-03-24

## 2020-03-24 NOTE — TELEPHONE ENCOUNTER
The patient was supposed to come in this Friday for a one month follow-up on PMDD, Mild Depression, and ARI. She asked for a refill on her medication. She is available for a billable Virtual Visit today, tomorrow, or Friday from 10:00 a.m. - 1:00 p.m. Gustavo Parikh control pills will come back in 3 to 4 months for a follow-up visit. Reviewed PHQ 9 and [place] severity scale.   Patient states that she has no plans or intentions on ever hurting herself and has never had any suicidal ideation just a feeling of wanting cost appointment if patient is interested. If [facility] clinician is on-site refer to [facility].   Score and Intervention  Score and Suggested Actions - Office Visit: 1- Low Risk:  Encourage patient to seek therapy or encourage patient to call [facility] [phone] to s medication benefits and side effects.     - busPIRone HCl 10 MG Oral Tab; Take 0.5-1 tablets (5-10 mg total) by mouth 2 (two) times daily as needed. Dispense: 90 tablet; Refill: 0    3.  Depression, major, in remission ([facility] [address]  Continue with Zoloft 50 mg mary kay

## 2020-06-22 DIAGNOSIS — F32.81 PMDD (PREMENSTRUAL DYSPHORIC DISORDER): ICD-10-CM

## 2020-06-23 NOTE — TELEPHONE ENCOUNTER
Pt requesting refill of SERTRALINE HCL 50 MG Oral Tab  One more refill given    Last Time Medication was Filled:  3/24/20    Last Office Visit with Provider: sunita bridges 3/23/20. Patient will follow-up in 3 months    No future appointments.

## 2020-07-18 ENCOUNTER — OFFICE VISIT (OUTPATIENT)
Dept: FAMILY MEDICINE CLINIC | Facility: CLINIC | Age: 35
End: 2020-07-18
Payer: COMMERCIAL

## 2020-07-18 VITALS
OXYGEN SATURATION: 96 % | DIASTOLIC BLOOD PRESSURE: 83 MMHG | BODY MASS INDEX: 24.96 KG/M2 | HEART RATE: 74 BPM | TEMPERATURE: 99 F | WEIGHT: 148 LBS | SYSTOLIC BLOOD PRESSURE: 136 MMHG | HEIGHT: 64.65 IN

## 2020-07-18 DIAGNOSIS — Z02.89 ENCOUNTER FOR PHYSICAL EXAMINATION RELATED TO EMPLOYMENT: Primary | ICD-10-CM

## 2020-07-18 LAB — INDURATION (): 0 MM (ref 0–11)

## 2020-07-18 PROCEDURE — 99213 OFFICE O/P EST LOW 20 MIN: CPT | Performed by: PHYSICIAN ASSISTANT

## 2020-07-18 PROCEDURE — 3079F DIAST BP 80-89 MM HG: CPT | Performed by: PHYSICIAN ASSISTANT

## 2020-07-18 PROCEDURE — 86580 TB INTRADERMAL TEST: CPT | Performed by: PHYSICIAN ASSISTANT

## 2020-07-18 PROCEDURE — 3008F BODY MASS INDEX DOCD: CPT | Performed by: PHYSICIAN ASSISTANT

## 2020-07-18 PROCEDURE — 3075F SYST BP GE 130 - 139MM HG: CPT | Performed by: PHYSICIAN ASSISTANT

## 2020-07-18 NOTE — PROGRESS NOTES
CHIEF COMPLAINT:     No chief complaint on file. HPI:   Doroteo Landry is a 37year old female who presents for employment physical.  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   The patient reports a history of SpO2 96%   BMI 24.90 kg/m²   GENERAL: well developed, well nourished,in no apparent distress, cooperative   SKIN: no rashes, nosuspicious lesions, no abnormal pigmentation  HEAD: atraumatic, normocephalic  EYES: EOM intact, PERRL.   Conjunctiva normal.  Cor

## 2020-07-18 NOTE — PATIENT INSTRUCTIONS
You will need to return to clinic in 48-72 hours to have results of TB test read. Please return to clinic on 07/20/20 after 12:30 or on 07/21/20 before 12:30 to have your TB test read.     If you do not return during this time your test will need to be

## 2020-07-20 ENCOUNTER — APPOINTMENT (OUTPATIENT)
Dept: FAMILY MEDICINE CLINIC | Facility: CLINIC | Age: 35
End: 2020-07-20
Payer: COMMERCIAL

## 2020-07-30 DIAGNOSIS — F32.81 PMDD (PREMENSTRUAL DYSPHORIC DISORDER): ICD-10-CM

## 2020-07-30 NOTE — TELEPHONE ENCOUNTER
Requested Prescriptions     Pending Prescriptions Disp Refills   • SERTRALINE HCL 50 MG Oral Tab [Pharmacy Med Name: Sertraline HCl 50 MG Oral Tablet] 30 tablet 0     Sig: Take 1 tablet by mouth once daily     Last fill was 6/23/20 30 tabs.   Last OV was 2/

## 2020-08-17 PROBLEM — F32.A DEPRESSIVE DISORDER IN REMISSION: Status: ACTIVE | Noted: 2020-03-24

## 2020-08-17 PROBLEM — F32.A MILD DEPRESSION: Status: RESOLVED | Noted: 2020-02-17 | Resolved: 2020-08-17

## 2020-08-17 PROBLEM — R30.0 DYSURIA: Status: RESOLVED | Noted: 2019-10-24 | Resolved: 2020-08-17

## 2020-08-17 PROBLEM — R63.5 WEIGHT GAIN: Status: ACTIVE | Noted: 2020-08-17

## 2020-08-17 NOTE — PROGRESS NOTES
Virtual Telephone Check-In    Kerry Hughes verbally consents to a Virtual/Telephone Check-In visit on 08/17/20. Patient has been referred to the [facility] website at [email or web address] to review the yearly Consent to Treat document.     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   joined the [facility] and will be swimming with her kids. Tolerates the Zoloft well with no side effects. Her PHQ 9 is at a 0.   Current Outpatient Medications   Medication Sig Dispense Refill   • Sertraline HCl 50 M Pmdd (premenstrual dysphoric disorder)  (primary encounter diagnosis)  Weight gain  Depressive disorder in remission  Clarke (generalized anxiety disorder)    No orders of the defined types were placed in this encounter.       Meds & Refills for this Visit:

## 2020-12-17 DIAGNOSIS — F32.81 PMDD (PREMENSTRUAL DYSPHORIC DISORDER): ICD-10-CM

## 2020-12-17 RX ORDER — NORGESTIMATE AND ETHINYL ESTRADIOL 7DAYSX3 28
KIT ORAL
Qty: 84 TABLET | Refills: 0 | Status: SHIPPED | OUTPATIENT
Start: 2020-12-17 | End: 2021-03-12

## 2020-12-17 NOTE — TELEPHONE ENCOUNTER
Requested Prescriptions     Signed Prescriptions Disp Refills   • TRI-SPRINTEC 0.18/0.215/0.25 MG-35 MCG Oral Tab 84 tablet 0     Sig: Take 1 tablet by mouth once daily     Authorizing Provider: Sunshine Cuenca     Ordering User: Maria D duque

## 2021-03-12 DIAGNOSIS — F32.81 PMDD (PREMENSTRUAL DYSPHORIC DISORDER): ICD-10-CM

## 2021-03-12 RX ORDER — NORGESTIMATE AND ETHINYL ESTRADIOL 7DAYSX3 28
KIT ORAL
Qty: 28 TABLET | Refills: 0 | Status: SHIPPED | OUTPATIENT
Start: 2021-03-12 | End: 2021-04-07

## 2021-03-12 NOTE — TELEPHONE ENCOUNTER
Requested Prescriptions     Signed Prescriptions Disp Refills   • TRI-SPRINTEC 0.18/0.215/0.25 MG-35 MCG Oral Tab 28 tablet 0     Sig: Take 1 tablet by mouth once daily     Authorizing Provider: Isabelle Quick     Ordering User: Dimas Lizarraga     One mo

## 2021-04-06 ENCOUNTER — TELEPHONE (OUTPATIENT)
Dept: FAMILY MEDICINE CLINIC | Facility: CLINIC | Age: 36
End: 2021-04-06

## 2021-04-06 DIAGNOSIS — F32.81 PMDD (PREMENSTRUAL DYSPHORIC DISORDER): ICD-10-CM

## 2021-04-06 RX ORDER — NORGESTIMATE AND ETHINYL ESTRADIOL 7DAYSX3 28
KIT ORAL
Qty: 28 TABLET | Refills: 0 | OUTPATIENT
Start: 2021-04-06

## 2021-04-06 NOTE — TELEPHONE ENCOUNTER
Sertraline HCl 50 MG Oral Tab 90 tablet 1 8/17/2020     TRI-SPRINTEC 0.18/0.215/0.25 MG-35 MCG Oral Tab 28 tablet 0 3/12/2021     LOV 8/17/20  One last refill approved last month.  Due for follow up    No future OV     Rx denied and Violet Grey message sent

## 2021-04-07 RX ORDER — NORGESTIMATE AND ETHINYL ESTRADIOL 7DAYSX3 28
1 KIT ORAL DAILY
Qty: 28 TABLET | Refills: 0 | Status: SHIPPED | OUTPATIENT
Start: 2021-04-07 | End: 2021-05-06

## 2021-04-07 NOTE — TELEPHONE ENCOUNTER
Pt called to see why her script was denied. She made an appt for 4/26/21, she wants to know if she can 1 refill to hold her over until her appt.   If so, she would like it sent to [facility] in [place] on [address]

## 2021-05-05 DIAGNOSIS — F32.81 PMDD (PREMENSTRUAL DYSPHORIC DISORDER): ICD-10-CM

## 2021-05-06 RX ORDER — NORGESTIMATE AND ETHINYL ESTRADIOL 7DAYSX3 28
KIT ORAL
Qty: 28 TABLET | Refills: 0 | Status: SHIPPED | OUTPATIENT
Start: 2021-05-06 | End: 2021-05-24

## 2021-05-06 NOTE — TELEPHONE ENCOUNTER
Requested Prescriptions     Signed Prescriptions Disp Refills   • TRI-SPRINTEC 0.18/0.215/0.25 MG-35 MCG Oral Tab 28 tablet 0     Sig: Take 1 tablet by mouth once daily     Authorizing Provider: Mayra Young     Ordering User: Allan Shetty     30 day

## 2021-05-13 DIAGNOSIS — F32.81 PMDD (PREMENSTRUAL DYSPHORIC DISORDER): ICD-10-CM

## 2021-05-13 NOTE — TELEPHONE ENCOUNTER
Requested Prescriptions     Pending Prescriptions Disp Refills   • SERTRALINE HCL 50 MG Oral Tab [Pharmacy Med Name: Sertraline HCl 50 MG Oral Tablet] 30 tablet 0     Sig: Take 1 tablet by mouth once daily     Last fill was 4/7/21 30 day  Last OV 8/17/20 v

## 2021-05-24 ENCOUNTER — OFFICE VISIT (OUTPATIENT)
Dept: FAMILY MEDICINE CLINIC | Facility: CLINIC | Age: 36
End: 2021-05-24
Payer: COMMERCIAL

## 2021-05-24 VITALS
HEART RATE: 76 BPM | SYSTOLIC BLOOD PRESSURE: 110 MMHG | TEMPERATURE: 98 F | BODY MASS INDEX: 27.49 KG/M2 | WEIGHT: 161 LBS | DIASTOLIC BLOOD PRESSURE: 76 MMHG | HEIGHT: 64.33 IN

## 2021-05-24 DIAGNOSIS — E66.3 OVERWEIGHT (BMI 25.0-29.9): ICD-10-CM

## 2021-05-24 DIAGNOSIS — Z00.00 WELL FEMALE EXAM WITHOUT GYNECOLOGICAL EXAM: Primary | ICD-10-CM

## 2021-05-24 DIAGNOSIS — R63.5 WEIGHT GAIN DUE TO MEDICATION: ICD-10-CM

## 2021-05-24 DIAGNOSIS — F41.1 GAD (GENERALIZED ANXIETY DISORDER): ICD-10-CM

## 2021-05-24 DIAGNOSIS — F32.81 PMDD (PREMENSTRUAL DYSPHORIC DISORDER): ICD-10-CM

## 2021-05-24 DIAGNOSIS — T50.905A WEIGHT GAIN DUE TO MEDICATION: ICD-10-CM

## 2021-05-24 DIAGNOSIS — Z79.899 MEDICATION MANAGEMENT: ICD-10-CM

## 2021-05-24 PROCEDURE — 3008F BODY MASS INDEX DOCD: CPT | Performed by: FAMILY MEDICINE

## 2021-05-24 PROCEDURE — 3074F SYST BP LT 130 MM HG: CPT | Performed by: FAMILY MEDICINE

## 2021-05-24 PROCEDURE — 99395 PREV VISIT EST AGE 18-39: CPT | Performed by: FAMILY MEDICINE

## 2021-05-24 PROCEDURE — 3078F DIAST BP <80 MM HG: CPT | Performed by: FAMILY MEDICINE

## 2021-05-24 PROCEDURE — 99214 OFFICE O/P EST MOD 30 MIN: CPT | Performed by: FAMILY MEDICINE

## 2021-05-24 RX ORDER — FLUOXETINE HYDROCHLORIDE 20 MG/1
20 CAPSULE ORAL DAILY
Qty: 90 CAPSULE | Refills: 1 | Status: SHIPPED | OUTPATIENT
Start: 2021-05-24 | End: 2021-12-23

## 2021-05-24 RX ORDER — BUSPIRONE HYDROCHLORIDE 5 MG/1
5 TABLET ORAL EVERY MORNING
Qty: 90 TABLET | Refills: 1 | Status: SHIPPED | OUTPATIENT
Start: 2021-05-24 | End: 2021-12-23

## 2021-05-24 RX ORDER — NORGESTIMATE AND ETHINYL ESTRADIOL 7DAYSX3 28
1 KIT ORAL DAILY
Qty: 90 TABLET | Refills: 3 | Status: SHIPPED | OUTPATIENT
Start: 2021-05-24

## 2021-05-24 NOTE — PROGRESS NOTES
HPI:   Gray Rater is a 31year old female who presents for a complete physical exam follow up on RX and weight gain issues.    COVID vaccine  done  Dental exams due  Eye exams UTD  PHQ9  2  Sleep Apnea  none  Exercise  none  DIet healthy   FH CAD or can Medication Sig Dispense Refill   • Norgestim-Eth Estrad Triphasic (TRI-SPRINTEC) 0.18/0.215/0.25 MG-35 MCG Oral Tab Take 1 tablet by mouth daily. 90 tablet 3   • busPIRone HCl 5 MG Oral Tab Take 1 tablet (5 mg total) by mouth every morning.  90 tablet 1 respiratory symptoms  LUNGS: denies cough or shortness of breath with exertion  CHEST:  denies breast changes or pain  CARDIOVASCULAR: denies chest pain or tightness on exertion: no edema  VASCULAR: denies leg cramps  GI: denies abdominal pain, bowel movem 31year old female who presents for a complete physical exam.    Well female exam without gynecological exam  (primary encounter diagnosis)  Clarke (generalized anxiety disorder)  Pmdd (premenstrual dysphoric disorder)  Overweight (bmi 25.0-29. 9)  Medication risks and benefits of hormonal contraception discussed including blood clot that can go to the lungs, heart or brain and cause Heart Attack, Stroke and even Death. These risks are further increased with smoking. Do not start smoking.  Patient verbalizes und

## 2021-05-24 NOTE — PATIENT INSTRUCTIONS
Supplement suggestions for energy/anxiety/insomnia. Methyl folate 400 mcg and methylcobalamin 1000 mcg.   Vitamin D3 @ 5000 international units, magnesium glycinate 400 mg

## 2021-12-22 DIAGNOSIS — F41.1 GAD (GENERALIZED ANXIETY DISORDER): ICD-10-CM

## 2021-12-22 DIAGNOSIS — F32.81 PMDD (PREMENSTRUAL DYSPHORIC DISORDER): ICD-10-CM

## 2021-12-23 RX ORDER — BUSPIRONE HYDROCHLORIDE 5 MG/1
5 TABLET ORAL EVERY MORNING
Qty: 30 TABLET | Refills: 0 | Status: SHIPPED | OUTPATIENT
Start: 2021-12-23 | End: 2022-01-17

## 2021-12-23 RX ORDER — FLUOXETINE HYDROCHLORIDE 20 MG/1
20 CAPSULE ORAL DAILY
Qty: 30 CAPSULE | Refills: 0 | Status: SHIPPED | OUTPATIENT
Start: 2021-12-23 | End: 2022-01-17

## 2021-12-23 RX ORDER — FLUOXETINE HYDROCHLORIDE 20 MG/1
CAPSULE ORAL
Qty: 90 CAPSULE | Refills: 0 | OUTPATIENT
Start: 2021-12-23

## 2021-12-23 RX ORDER — BUSPIRONE HYDROCHLORIDE 5 MG/1
TABLET ORAL
Qty: 30 TABLET | Refills: 0 | OUTPATIENT
Start: 2021-12-23

## 2021-12-23 RX ORDER — NORGESTIMATE AND ETHINYL ESTRADIOL 7DAYSX3 28
KIT ORAL
Qty: 28 TABLET | Refills: 0 | OUTPATIENT
Start: 2021-12-23

## 2021-12-23 NOTE — TELEPHONE ENCOUNTER
Requested Prescriptions     Pending Prescriptions Disp Refills   • FLUOXETINE 20 MG Oral Cap [Pharmacy Med Name: FLUoxetine HCl 20 MG Oral Capsule] 90 capsule 0     Sig: TAKE 1 CAPSULE BY MOUTH ONCE DAILY START  AFTER  TAPERING  OFF  SERTRALINE   • WALLYRO

## 2022-01-17 NOTE — PROGRESS NOTES
Nazanin Vail is a 30year old female. HPI:   Delbert Gan is in for follow-up on PMDD and BuSpar use as well as fluoxetine for anxiety.   She states that she has been doing well overall with no major issues does state over [place] she was more stressed out and or watching television: Not at all  8. Moving or speaking so slowly that other people could have noticed. Or the opposite - being so fidgety or restless that you have been moving around a lot more than usual: Not at all  9.  Thoughts that you would be melba headaches  Musculoskeletal: No motor deficits  Psych see above  EXAM:   /78 (BP Location: Left arm, Patient Position: Sitting, Cuff Size: adult)   Pulse 80   Temp 98.1 °F (36.7 °C) (Temporal)   Ht 5' 4.61\" (1.641 m)   Wt 153 lb (69.4 kg)   LMP 01/11 symptoms.      Presently patient is doing great with her medication and is using 5 mg of the BuSpar in the morning will increase to 10 mg if she is having intermittent increase in her anxiety but overall is feeling well with the combination.    - busPIRone

## 2022-01-18 PROBLEM — Z91.09 ENVIRONMENTAL ALLERGIES: Status: ACTIVE | Noted: 2022-01-18

## 2022-01-30 DIAGNOSIS — F32.81 PMDD (PREMENSTRUAL DYSPHORIC DISORDER): ICD-10-CM

## 2022-01-31 RX ORDER — NORGESTIMATE AND ETHINYL ESTRADIOL 7DAYSX3 28
KIT ORAL
Qty: 28 TABLET | Refills: 0 | OUTPATIENT
Start: 2022-01-31

## 2022-01-31 NOTE — TELEPHONE ENCOUNTER
Pt requesting refill of Tri-Sprintec 0.18/0.215/0.25 MG-35 MCG Oral Tablet    Last Time Medication was Prescribed :  05/24/2021 qty # 90 with 3 refills    Last Office Visit with Provider: 01/17/2022    Recommended to return by Provider: The patient is aske

## 2022-02-01 RX ORDER — NORGESTIMATE AND ETHINYL ESTRADIOL 7DAYSX3 28
KIT ORAL
Qty: 28 TABLET | Refills: 0 | OUTPATIENT
Start: 2022-02-01

## 2022-02-09 RX ORDER — NORGESTIMATE AND ETHINYL ESTRADIOL 7DAYSX3 28
1 KIT ORAL DAILY
Qty: 90 TABLET | Refills: 3 | Status: SHIPPED | OUTPATIENT
Start: 2022-02-09

## 2022-05-07 ENCOUNTER — HOSPITAL ENCOUNTER (OUTPATIENT)
Age: 37
Discharge: ANOTHER HOSPITAL | End: 2022-05-07
Attending: EMERGENCY MEDICINE
Payer: COMMERCIAL

## 2022-05-07 ENCOUNTER — HOSPITAL ENCOUNTER (EMERGENCY)
Facility: HOSPITAL | Age: 37
Discharge: HOME | End: 2022-05-07
Attending: EMERGENCY MEDICINE
Payer: COMMERCIAL

## 2022-05-07 ENCOUNTER — OFFICE VISIT (OUTPATIENT)
Dept: FAMILY MEDICINE CLINIC | Facility: CLINIC | Age: 37
End: 2022-05-07
Payer: COMMERCIAL

## 2022-05-07 ENCOUNTER — APPOINTMENT (OUTPATIENT)
Dept: CT IMAGING | Facility: HOSPITAL | Age: 37
End: 2022-05-07
Attending: EMERGENCY MEDICINE
Payer: COMMERCIAL

## 2022-05-07 VITALS
SYSTOLIC BLOOD PRESSURE: 117 MMHG | TEMPERATURE: 98 F | BODY MASS INDEX: 26.98 KG/M2 | HEART RATE: 90 BPM | RESPIRATION RATE: 15 BRPM | OXYGEN SATURATION: 97 % | DIASTOLIC BLOOD PRESSURE: 68 MMHG | WEIGHT: 158 LBS | HEIGHT: 64 IN

## 2022-05-07 VITALS
HEART RATE: 101 BPM | SYSTOLIC BLOOD PRESSURE: 129 MMHG | WEIGHT: 150 LBS | DIASTOLIC BLOOD PRESSURE: 68 MMHG | BODY MASS INDEX: 25.61 KG/M2 | HEIGHT: 64 IN | RESPIRATION RATE: 18 BRPM | OXYGEN SATURATION: 100 % | TEMPERATURE: 99 F

## 2022-05-07 DIAGNOSIS — R07.89 CHEST PAIN, ATYPICAL: Primary | ICD-10-CM

## 2022-05-07 DIAGNOSIS — R55 SYNCOPE, NEAR: Primary | ICD-10-CM

## 2022-05-07 DIAGNOSIS — R07.9 CHEST PAIN OF UNCERTAIN ETIOLOGY: ICD-10-CM

## 2022-05-07 DIAGNOSIS — Z02.9 ENCOUNTERS FOR ADMINISTRATIVE PURPOSES: Primary | ICD-10-CM

## 2022-05-07 LAB
ALBUMIN SERPL-MCNC: 3.6 G/DL (ref 3.4–5)
ALBUMIN/GLOB SERPL: 1 {RATIO} (ref 1–2)
ALP LIVER SERPL-CCNC: 73 U/L
ALT SERPL-CCNC: 19 U/L
ANION GAP SERPL CALC-SCNC: 7 MMOL/L (ref 0–18)
AST SERPL-CCNC: 10 U/L (ref 15–37)
B-HCG UR QL: NEGATIVE
BASOPHILS # BLD AUTO: 0.03 X10(3) UL (ref 0–0.2)
BASOPHILS NFR BLD AUTO: 0.3 %
BILIRUB SERPL-MCNC: 0.8 MG/DL (ref 0.1–2)
BUN BLD-MCNC: 16 MG/DL (ref 7–18)
CALCIUM BLD-MCNC: 8.9 MG/DL (ref 8.5–10.1)
CHLORIDE SERPL-SCNC: 102 MMOL/L (ref 98–112)
CO2 SERPL-SCNC: 27 MMOL/L (ref 21–32)
CREAT BLD-MCNC: 0.83 MG/DL
D DIMER PPP FEU-MCNC: 0.41 UG/ML FEU (ref ?–0.5)
EOSINOPHIL # BLD AUTO: 0.03 X10(3) UL (ref 0–0.7)
EOSINOPHIL NFR BLD AUTO: 0.3 %
ERYTHROCYTE [DISTWIDTH] IN BLOOD BY AUTOMATED COUNT: 11.9 %
GLOBULIN PLAS-MCNC: 3.7 G/DL (ref 2.8–4.4)
GLUCOSE BLD-MCNC: 95 MG/DL (ref 70–99)
HCT VFR BLD AUTO: 42.2 %
HGB BLD-MCNC: 13.9 G/DL
IMM GRANULOCYTES # BLD AUTO: 0.04 X10(3) UL (ref 0–1)
IMM GRANULOCYTES NFR BLD: 0.3 %
LYMPHOCYTES # BLD AUTO: 0.84 X10(3) UL (ref 1–4)
LYMPHOCYTES NFR BLD AUTO: 7.2 %
MCH RBC QN AUTO: 30.3 PG (ref 26–34)
MCHC RBC AUTO-ENTMCNC: 32.9 G/DL (ref 31–37)
MCV RBC AUTO: 92.1 FL
MONOCYTES # BLD AUTO: 0.79 X10(3) UL (ref 0.1–1)
MONOCYTES NFR BLD AUTO: 6.8 %
NEUTROPHILS # BLD AUTO: 9.96 X10 (3) UL (ref 1.5–7.7)
NEUTROPHILS # BLD AUTO: 9.96 X10(3) UL (ref 1.5–7.7)
NEUTROPHILS NFR BLD AUTO: 85.1 %
OSMOLALITY SERPL CALC.SUM OF ELEC: 283 MOSM/KG (ref 275–295)
PLATELET # BLD AUTO: 303 10(3)UL (ref 150–450)
POCT BILIRUBIN URINE: NEGATIVE
POCT GLUCOSE URINE: NEGATIVE MG/DL
POCT KETONE URINE: NEGATIVE MG/DL
POCT LEUKOCYTE ESTERASE URINE: NEGATIVE
POCT NITRITE URINE: NEGATIVE
POCT PH URINE: 6 (ref 5–8)
POCT PROTEIN URINE: 30 MG/DL
POCT SPECIFIC GRAVITY URINE: 1.03
POCT UROBILINOGEN URINE: 0.2 MG/DL
POTASSIUM SERPL-SCNC: 3.4 MMOL/L (ref 3.5–5.1)
PROT SERPL-MCNC: 7.3 G/DL (ref 6.4–8.2)
RBC # BLD AUTO: 4.58 X10(6)UL
SODIUM SERPL-SCNC: 136 MMOL/L (ref 136–145)
TROPONIN I HIGH SENSITIVITY: 13 NG/L
WBC # BLD AUTO: 11.7 X10(3) UL (ref 4–11)

## 2022-05-07 PROCEDURE — S0028 INJECTION, FAMOTIDINE, 20 MG: HCPCS | Performed by: EMERGENCY MEDICINE

## 2022-05-07 PROCEDURE — 99284 EMERGENCY DEPT VISIT MOD MDM: CPT

## 2022-05-07 PROCEDURE — 81002 URINALYSIS NONAUTO W/O SCOPE: CPT | Performed by: EMERGENCY MEDICINE

## 2022-05-07 PROCEDURE — 93005 ELECTROCARDIOGRAM TRACING: CPT

## 2022-05-07 PROCEDURE — 84484 ASSAY OF TROPONIN QUANT: CPT | Performed by: EMERGENCY MEDICINE

## 2022-05-07 PROCEDURE — 85379 FIBRIN DEGRADATION QUANT: CPT | Performed by: EMERGENCY MEDICINE

## 2022-05-07 PROCEDURE — 80053 COMPREHEN METABOLIC PANEL: CPT | Performed by: EMERGENCY MEDICINE

## 2022-05-07 PROCEDURE — 99214 OFFICE O/P EST MOD 30 MIN: CPT

## 2022-05-07 PROCEDURE — 71275 CT ANGIOGRAPHY CHEST: CPT | Performed by: EMERGENCY MEDICINE

## 2022-05-07 PROCEDURE — 81025 URINE PREGNANCY TEST: CPT

## 2022-05-07 PROCEDURE — 81002 URINALYSIS NONAUTO W/O SCOPE: CPT | Performed by: PHYSICIAN ASSISTANT

## 2022-05-07 PROCEDURE — 96374 THER/PROPH/DIAG INJ IV PUSH: CPT

## 2022-05-07 PROCEDURE — 93010 ELECTROCARDIOGRAM REPORT: CPT

## 2022-05-07 PROCEDURE — 85025 COMPLETE CBC W/AUTO DIFF WBC: CPT | Performed by: EMERGENCY MEDICINE

## 2022-05-07 RX ORDER — PANTOPRAZOLE SODIUM 40 MG/1
40 TABLET, DELAYED RELEASE ORAL DAILY
Qty: 30 TABLET | Refills: 0 | Status: SHIPPED | OUTPATIENT
Start: 2022-05-07 | End: 2022-06-06

## 2022-05-07 RX ORDER — IOHEXOL 350 MG/ML
100 INJECTION, SOLUTION INTRAVENOUS
Status: COMPLETED | OUTPATIENT
Start: 2022-05-07 | End: 2022-05-07

## 2022-05-07 RX ORDER — FAMOTIDINE 10 MG/ML
20 INJECTION, SOLUTION INTRAVENOUS ONCE
Status: COMPLETED | OUTPATIENT
Start: 2022-05-07 | End: 2022-05-07

## 2022-05-07 NOTE — PROGRESS NOTES
CHIEF COMPLAINT:   Patient presents with:  Flu: Possible stomach flu? Acid reflux? Trouble sleeping from stomach issues at night. - Entered by patient      HPI:   Henok Skaggs is a 30year old female who presents for GI upset n/v, emesis x1 last night. Also c/o chest pain 2 days nights. Tolerating sips of fluid but states she feels dehydrated. After phone triage from car, referred patient to [facility] for further workup. Discussed limitations of [facility] and need for further evaluation. Patient verbalized understanding of rationale for further evaluation and was stable upon discharge. No visit charge.

## 2022-05-07 NOTE — ED INITIAL ASSESSMENT (HPI)
Pt here after he fell running. Has a stone in right palm. PCP numbed the sung and attempted to remove it but was concerned he might hit a ligament and sent him here. Pt also w superficial abrasions to left palm,l knee and ankle.  UTD on tetanus Pt presents today with several complaints. Pt states that on Wednesday night she woke up with heartburn in the middle of the night. The pt states that on Thursday night she woke up with chest pain that radiates into her upper back. Pt states that she is still having intermittent chest pains. Pt states that last night, in the middle of the night she woke up feeling dizzy and fell in her kitchen. Pt states that she then had an episode of diarrhea and began vomiting several times.

## 2022-05-08 LAB
ATRIAL RATE: 82 BPM
ATRIAL RATE: 91 BPM
P AXIS: 61 DEGREES
P AXIS: 69 DEGREES
P-R INTERVAL: 136 MS
P-R INTERVAL: 148 MS
Q-T INTERVAL: 366 MS
Q-T INTERVAL: 370 MS
QRS DURATION: 80 MS
QRS DURATION: 84 MS
QTC CALCULATION (BEZET): 427 MS
QTC CALCULATION (BEZET): 455 MS
R AXIS: 16 DEGREES
R AXIS: 24 DEGREES
T AXIS: -12 DEGREES
T AXIS: 5 DEGREES
VENTRICULAR RATE: 82 BPM
VENTRICULAR RATE: 91 BPM

## 2022-05-10 NOTE — ED PROVIDER NOTES
Case reviewed. Patient requires investigation beyond the capacity of this urgent care.   She was referred to the emergency department for evaluation

## 2022-07-26 DIAGNOSIS — F41.1 GAD (GENERALIZED ANXIETY DISORDER): ICD-10-CM

## 2022-07-26 RX ORDER — BUSPIRONE HYDROCHLORIDE 5 MG/1
TABLET ORAL
Qty: 45 TABLET | Refills: 0 | Status: SHIPPED | OUTPATIENT
Start: 2022-07-26 | End: 2022-07-26

## 2022-07-26 RX ORDER — BUSPIRONE HYDROCHLORIDE 5 MG/1
TABLET ORAL EVERY MORNING
Qty: 45 TABLET | Refills: 0 | Status: SHIPPED | OUTPATIENT
Start: 2022-07-26

## 2022-08-22 DIAGNOSIS — F41.1 GAD (GENERALIZED ANXIETY DISORDER): ICD-10-CM

## 2022-08-22 DIAGNOSIS — F32.81 PMDD (PREMENSTRUAL DYSPHORIC DISORDER): ICD-10-CM

## 2022-08-24 RX ORDER — FLUOXETINE HYDROCHLORIDE 20 MG/1
CAPSULE ORAL
Qty: 30 CAPSULE | Refills: 0 | Status: SHIPPED | OUTPATIENT
Start: 2022-08-24

## 2022-08-31 DIAGNOSIS — F41.1 GAD (GENERALIZED ANXIETY DISORDER): ICD-10-CM

## 2022-09-01 RX ORDER — BUSPIRONE HYDROCHLORIDE 5 MG/1
TABLET ORAL EVERY MORNING
Qty: 30 TABLET | Refills: 0 | Status: SHIPPED | OUTPATIENT
Start: 2022-09-01 | End: 2022-10-03

## 2022-09-19 DIAGNOSIS — F41.1 GAD (GENERALIZED ANXIETY DISORDER): ICD-10-CM

## 2022-09-19 DIAGNOSIS — F32.81 PMDD (PREMENSTRUAL DYSPHORIC DISORDER): ICD-10-CM

## 2022-09-20 RX ORDER — FLUOXETINE HYDROCHLORIDE 20 MG/1
CAPSULE ORAL
Qty: 30 CAPSULE | Refills: 0 | Status: SHIPPED | OUTPATIENT
Start: 2022-09-20

## 2022-10-03 ENCOUNTER — OFFICE VISIT (OUTPATIENT)
Dept: FAMILY MEDICINE CLINIC | Facility: CLINIC | Age: 37
End: 2022-10-03
Payer: COMMERCIAL

## 2022-10-03 VITALS
TEMPERATURE: 98 F | SYSTOLIC BLOOD PRESSURE: 114 MMHG | OXYGEN SATURATION: 99 % | BODY MASS INDEX: 26 KG/M2 | DIASTOLIC BLOOD PRESSURE: 72 MMHG | HEART RATE: 87 BPM | WEIGHT: 154 LBS

## 2022-10-03 DIAGNOSIS — Z13.0 SCREENING FOR ENDOCRINE, NUTRITIONAL, METABOLIC AND IMMUNITY DISORDER: ICD-10-CM

## 2022-10-03 DIAGNOSIS — Z79.899 MEDICATION MANAGEMENT: ICD-10-CM

## 2022-10-03 DIAGNOSIS — Z13.29 SCREENING FOR ENDOCRINE, NUTRITIONAL, METABOLIC AND IMMUNITY DISORDER: ICD-10-CM

## 2022-10-03 DIAGNOSIS — Z13.6 ENCOUNTER FOR LIPID SCREENING FOR CARDIOVASCULAR DISEASE: ICD-10-CM

## 2022-10-03 DIAGNOSIS — Z01.419 WELL FEMALE EXAM WITH ROUTINE GYNECOLOGICAL EXAM: Primary | ICD-10-CM

## 2022-10-03 DIAGNOSIS — Z13.220 ENCOUNTER FOR LIPID SCREENING FOR CARDIOVASCULAR DISEASE: ICD-10-CM

## 2022-10-03 DIAGNOSIS — F32.81 PMDD (PREMENSTRUAL DYSPHORIC DISORDER): ICD-10-CM

## 2022-10-03 DIAGNOSIS — Z13.21 SCREENING FOR ENDOCRINE, NUTRITIONAL, METABOLIC AND IMMUNITY DISORDER: ICD-10-CM

## 2022-10-03 DIAGNOSIS — D22.9 MULTIPLE PIGMENTED NEVI: ICD-10-CM

## 2022-10-03 DIAGNOSIS — Z11.51 SCREENING FOR HUMAN PAPILLOMAVIRUS: ICD-10-CM

## 2022-10-03 DIAGNOSIS — Z80.3 FAMILY HISTORY OF BREAST CANCER: ICD-10-CM

## 2022-10-03 DIAGNOSIS — E66.3 OVERWEIGHT (BMI 25.0-29.9): ICD-10-CM

## 2022-10-03 DIAGNOSIS — Z12.31 VISIT FOR SCREENING MAMMOGRAM: ICD-10-CM

## 2022-10-03 DIAGNOSIS — Z13.228 SCREENING FOR ENDOCRINE, NUTRITIONAL, METABOLIC AND IMMUNITY DISORDER: ICD-10-CM

## 2022-10-03 DIAGNOSIS — F41.1 GAD (GENERALIZED ANXIETY DISORDER): ICD-10-CM

## 2022-10-03 DIAGNOSIS — Z12.4 ENCOUNTER FOR SCREENING FOR MALIGNANT NEOPLASM OF CERVIX: ICD-10-CM

## 2022-10-03 PROCEDURE — 87624 HPV HI-RISK TYP POOLED RSLT: CPT | Performed by: FAMILY MEDICINE

## 2022-10-03 PROCEDURE — 3078F DIAST BP <80 MM HG: CPT | Performed by: FAMILY MEDICINE

## 2022-10-03 PROCEDURE — 99395 PREV VISIT EST AGE 18-39: CPT | Performed by: FAMILY MEDICINE

## 2022-10-03 PROCEDURE — 3074F SYST BP LT 130 MM HG: CPT | Performed by: FAMILY MEDICINE

## 2022-10-03 PROCEDURE — 99213 OFFICE O/P EST LOW 20 MIN: CPT | Performed by: FAMILY MEDICINE

## 2022-10-03 RX ORDER — NORGESTIMATE AND ETHINYL ESTRADIOL 7DAYSX3 28
1 KIT ORAL DAILY
Qty: 90 TABLET | Refills: 3 | Status: SHIPPED | OUTPATIENT
Start: 2022-10-03

## 2022-10-03 RX ORDER — FLUOXETINE 10 MG/1
10 CAPSULE ORAL DAILY
Qty: 90 CAPSULE | Refills: 0 | Status: SHIPPED | OUTPATIENT
Start: 2022-10-03

## 2022-10-03 RX ORDER — MAGNESIUM OXIDE 400 MG (241.3 MG MAGNESIUM) TABLET
500 TABLET DAILY
COMMUNITY

## 2022-10-03 RX ORDER — BUSPIRONE HYDROCHLORIDE 5 MG/1
TABLET ORAL EVERY MORNING
Qty: 90 TABLET | Refills: 1 | Status: SHIPPED | OUTPATIENT
Start: 2022-10-03

## 2022-10-04 PROBLEM — E66.3 OVERWEIGHT (BMI 25.0-29.9): Status: ACTIVE | Noted: 2022-10-04

## 2022-10-04 PROBLEM — F32.A DEPRESSIVE DISORDER IN REMISSION: Status: RESOLVED | Noted: 2020-03-24 | Resolved: 2022-10-04

## 2022-10-04 LAB — HPV I/H RISK 1 DNA SPEC QL NAA+PROBE: NEGATIVE

## 2022-10-16 DIAGNOSIS — F32.81 PMDD (PREMENSTRUAL DYSPHORIC DISORDER): ICD-10-CM

## 2022-10-16 DIAGNOSIS — F41.1 GAD (GENERALIZED ANXIETY DISORDER): ICD-10-CM

## 2022-10-18 RX ORDER — FLUOXETINE HYDROCHLORIDE 20 MG/1
CAPSULE ORAL
Qty: 30 CAPSULE | Refills: 0 | OUTPATIENT
Start: 2022-10-18

## 2022-10-28 DIAGNOSIS — F41.1 GAD (GENERALIZED ANXIETY DISORDER): ICD-10-CM

## 2022-10-28 RX ORDER — BUSPIRONE HYDROCHLORIDE 5 MG/1
TABLET ORAL EVERY MORNING
Qty: 90 TABLET | Refills: 1 | OUTPATIENT
Start: 2022-10-28

## 2023-09-09 DIAGNOSIS — F32.81 PMDD (PREMENSTRUAL DYSPHORIC DISORDER): ICD-10-CM

## 2023-09-09 RX ORDER — NORGESTIMATE AND ETHINYL ESTRADIOL 7DAYSX3 28
1 KIT ORAL DAILY
Qty: 84 TABLET | Refills: 3 | Status: SHIPPED | OUTPATIENT
Start: 2023-09-09

## 2023-12-13 ENCOUNTER — PATIENT MESSAGE (OUTPATIENT)
Dept: FAMILY MEDICINE CLINIC | Facility: CLINIC | Age: 38
End: 2023-12-13

## 2023-12-13 ENCOUNTER — HOSPITAL ENCOUNTER (EMERGENCY)
Facility: HOSPITAL | Age: 38
Discharge: HOME | End: 2023-12-13
Attending: EMERGENCY MEDICINE
Payer: COMMERCIAL

## 2023-12-13 ENCOUNTER — TELEPHONE (OUTPATIENT)
Dept: FAMILY MEDICINE CLINIC | Facility: CLINIC | Age: 38
End: 2023-12-13

## 2023-12-13 VITALS
RESPIRATION RATE: 16 BRPM | WEIGHT: 136 LBS | SYSTOLIC BLOOD PRESSURE: 124 MMHG | BODY MASS INDEX: 23.22 KG/M2 | HEIGHT: 64 IN | HEART RATE: 73 BPM | TEMPERATURE: 99 F | DIASTOLIC BLOOD PRESSURE: 82 MMHG | OXYGEN SATURATION: 99 %

## 2023-12-13 DIAGNOSIS — M54.50 LOW BACK PAIN WITHOUT SCIATICA, UNSPECIFIED BACK PAIN LATERALITY, UNSPECIFIED CHRONICITY: ICD-10-CM

## 2023-12-13 DIAGNOSIS — R10.9 ABDOMINAL PAIN, UNSPECIFIED ABDOMINAL LOCATION: ICD-10-CM

## 2023-12-13 DIAGNOSIS — R39.9 UTI SYMPTOMS: Primary | ICD-10-CM

## 2023-12-13 DIAGNOSIS — N12 PYELONEPHRITIS: Primary | ICD-10-CM

## 2023-12-13 LAB
B-HCG UR QL: NEGATIVE
BILIRUB UR QL STRIP.AUTO: NEGATIVE
CLARITY UR REFRACT.AUTO: CLEAR
COLOR UR AUTO: YELLOW
GLUCOSE UR STRIP.AUTO-MCNC: NORMAL MG/DL
KETONES UR STRIP.AUTO-MCNC: NEGATIVE MG/DL
LEUKOCYTE ESTERASE UR QL STRIP.AUTO: 500
NITRITE UR QL STRIP.AUTO: NEGATIVE
PH UR STRIP.AUTO: 6 [PH] (ref 5–8)
PROT UR STRIP.AUTO-MCNC: 30 MG/DL
RBC #/AREA URNS AUTO: >10 /HPF
SP GR UR STRIP.AUTO: 1.02 (ref 1–1.03)
UROBILINOGEN UR STRIP.AUTO-MCNC: NORMAL MG/DL
WBC #/AREA URNS AUTO: >50 /HPF

## 2023-12-13 PROCEDURE — 87086 URINE CULTURE/COLONY COUNT: CPT | Performed by: EMERGENCY MEDICINE

## 2023-12-13 PROCEDURE — 87077 CULTURE AEROBIC IDENTIFY: CPT | Performed by: EMERGENCY MEDICINE

## 2023-12-13 PROCEDURE — 99283 EMERGENCY DEPT VISIT LOW MDM: CPT

## 2023-12-13 PROCEDURE — 99284 EMERGENCY DEPT VISIT MOD MDM: CPT

## 2023-12-13 PROCEDURE — 81001 URINALYSIS AUTO W/SCOPE: CPT

## 2023-12-13 PROCEDURE — 81001 URINALYSIS AUTO W/SCOPE: CPT | Performed by: EMERGENCY MEDICINE

## 2023-12-13 PROCEDURE — 81025 URINE PREGNANCY TEST: CPT

## 2023-12-13 RX ORDER — CEPHALEXIN 500 MG/1
500 CAPSULE ORAL 2 TIMES DAILY
Qty: 14 CAPSULE | Refills: 0 | Status: SHIPPED | OUTPATIENT
Start: 2023-12-13 | End: 2023-12-20

## 2023-12-13 RX ORDER — CEPHALEXIN 500 MG/1
500 CAPSULE ORAL ONCE
Status: COMPLETED | OUTPATIENT
Start: 2023-12-13 | End: 2023-12-13

## 2023-12-13 RX ORDER — ONDANSETRON 4 MG/1
4 TABLET, ORALLY DISINTEGRATING ORAL EVERY 4 HOURS PRN
Qty: 10 TABLET | Refills: 0 | Status: SHIPPED | OUTPATIENT
Start: 2023-12-13 | End: 2023-12-20

## 2023-12-13 NOTE — TELEPHONE ENCOUNTER
Urine culture and urinalysis outpatient today     Please triage ask if fever, chills body aches, nausea, back pain abd pain, freq, urgency burning. ..... Lola Sofia

## 2023-12-13 NOTE — TELEPHONE ENCOUNTER
Martín Cox,     I left you a voicemail Carleton Merlin would like you to go the lab today to get a urinalysis and culture completed before she will  prescript anything. If you have any additional questions or concerns please contact the office via my chart or phone @ [phone].      Thank you   Nazanin Metcalf

## 2023-12-14 NOTE — DISCHARGE INSTRUCTIONS
Drink lots of water, enough that you are able to urinate a full bladder every 1-2 hours while you are awake. Ibuprofen with food and Tylenol as needed for pain. Zofran as needed for nausea  Finish Keflex twice a day for 7 days  Any irritation to your private area can increase the risk of UTI. Urinate after intercourse, wear loose cotton underwear, loose clothing.   Return for any problems

## 2023-12-15 NOTE — PROGRESS NOTES
ED Culture Callback Results Review    Pharmacist reviewed culture results from ED visit . Final urine culture positive for Staphylococcus saprophyticus. Patient was prescribed Cephalexin (Keflex) on discharge, to which the isolate is presumed susceptible. No further intervention required at this time.       Thank you,    Debora Kirk, PharmD, BCPS, [facility] - Emergency Medicine  12/15/23; 1:28 PM

## 2024-01-03 ENCOUNTER — OFFICE VISIT (OUTPATIENT)
Dept: FAMILY MEDICINE CLINIC | Facility: CLINIC | Age: 39
End: 2024-01-03
Payer: COMMERCIAL

## 2024-01-03 VITALS
DIASTOLIC BLOOD PRESSURE: 66 MMHG | OXYGEN SATURATION: 98 % | TEMPERATURE: 97 F | RESPIRATION RATE: 16 BRPM | HEART RATE: 84 BPM | HEIGHT: 64 IN | WEIGHT: 138 LBS | BODY MASS INDEX: 23.56 KG/M2 | SYSTOLIC BLOOD PRESSURE: 110 MMHG

## 2024-01-03 DIAGNOSIS — Z01.419 WELL FEMALE EXAM WITH ROUTINE GYNECOLOGICAL EXAM: Primary | ICD-10-CM

## 2024-01-03 DIAGNOSIS — Z13.6 ENCOUNTER FOR LIPID SCREENING FOR CARDIOVASCULAR DISEASE: ICD-10-CM

## 2024-01-03 DIAGNOSIS — Z13.228 SCREENING FOR ENDOCRINE, NUTRITIONAL, METABOLIC AND IMMUNITY DISORDER: ICD-10-CM

## 2024-01-03 DIAGNOSIS — Z12.31 VISIT FOR SCREENING MAMMOGRAM: ICD-10-CM

## 2024-01-03 DIAGNOSIS — Z30.09 BIRTH CONTROL COUNSELING: ICD-10-CM

## 2024-01-03 DIAGNOSIS — Z11.8 ENCOUNTER FOR SCREENING EXAMINATION FOR CHLAMYDIAL INFECTION: ICD-10-CM

## 2024-01-03 DIAGNOSIS — Z11.51 ENCOUNTER FOR SCREENING FOR HUMAN PAPILLOMAVIRUS (HPV): ICD-10-CM

## 2024-01-03 DIAGNOSIS — F90.0 ATTENTION DEFICIT HYPERACTIVITY DISORDER (ADHD), PREDOMINANTLY INATTENTIVE TYPE: ICD-10-CM

## 2024-01-03 DIAGNOSIS — Z13.220 ENCOUNTER FOR LIPID SCREENING FOR CARDIOVASCULAR DISEASE: ICD-10-CM

## 2024-01-03 DIAGNOSIS — Z13.21 SCREENING FOR ENDOCRINE, NUTRITIONAL, METABOLIC AND IMMUNITY DISORDER: ICD-10-CM

## 2024-01-03 DIAGNOSIS — F41.1 GAD (GENERALIZED ANXIETY DISORDER): ICD-10-CM

## 2024-01-03 DIAGNOSIS — Z79.899 NEW MEDICATION ADDED: ICD-10-CM

## 2024-01-03 DIAGNOSIS — Z13.29 SCREENING FOR ENDOCRINE, NUTRITIONAL, METABOLIC AND IMMUNITY DISORDER: ICD-10-CM

## 2024-01-03 DIAGNOSIS — D22.9 MULTIPLE PIGMENTED NEVI: ICD-10-CM

## 2024-01-03 DIAGNOSIS — Z80.3 FH: BREAST CANCER: ICD-10-CM

## 2024-01-03 DIAGNOSIS — Z13.0 SCREENING FOR ENDOCRINE, NUTRITIONAL, METABOLIC AND IMMUNITY DISORDER: ICD-10-CM

## 2024-01-03 DIAGNOSIS — Z11.3 SCREENING FOR GONORRHEA: ICD-10-CM

## 2024-01-03 DIAGNOSIS — Z12.4 ENCOUNTER FOR SCREENING FOR CERVICAL CANCER: ICD-10-CM

## 2024-01-03 PROCEDURE — 87491 CHLMYD TRACH DNA AMP PROBE: CPT | Performed by: FAMILY MEDICINE

## 2024-01-03 PROCEDURE — 88175 CYTOPATH C/V AUTO FLUID REDO: CPT | Performed by: FAMILY MEDICINE

## 2024-01-03 PROCEDURE — 87591 N.GONORRHOEAE DNA AMP PROB: CPT | Performed by: FAMILY MEDICINE

## 2024-01-03 NOTE — PROGRESS NOTES
HPI:   Denisha Roberts is a 38 year old female who presents for a complete physical exam    ---Attention/ARI issues  Patient has history of anxiety and states that she has been extremely distracted noting it more recently since going off the anxiety medication.  Forgets things, distracted is forgetting easily, even forgets she is cooking something and has burned it.  Forgets what people say gets into arguments with boyfriend because she forgets things  Mildly  hyperactive knowing that she will sometimes talk excessively, blurts out answers, has a hard time waiting her turn and interrupts others.  Attention issues include difficulty sustaining attention and activities, does not listen, no follow-through, hard to organize, avoids tasks that she dislikes, loses important items, easily distractible and forgets things in her daily activities  Starts tasks and hard to finish  Has a lot of good ideas but does not sit down to do them because of distraction.  Takes on a lot and gets excited about things but poor attention.    Denies any fluctuations in mood has not really had any depression symptoms more just the anxiety  Has never been treated for ADD before.  Feels it affects her relationships broke up with her boyfriend this summer because she felt there was something else better for her and then got back together with him  When off anxiety medicine and birth control since they are affecting her libido and caused weight gain.  Adult ADHD-RS-IV questionnaire filled out please see scanned in document.   She has no prior cardiac history denying any chest pain, shortness of breath, dizziness or fainting spells does not have any tics or tremors or history of seizures.  Works as a   is required to multitask finds that her symptoms are more bothersome at home and at work does struggle with her everyday activities.  Also has 2 younger children.  Adult ADHD-RS-IV with adult prompts    Carelessness at a  2  Difficulty sustaining attention and activities at a 3  Does not listen at a 3   No follow-through at a 3   Cannot organize at a 3  Avoids/dislikes tasks that require sustained mental effort at a 3  Losing important items at a 3   Easily distractible at a 3  Forgetful in daily activities at a 3  Squirms and fidgets at a 2,   Cannot stay seated at a 2  Runs/climbs excessively a 1  Cannot play or work quietly at a 2  On the go or driven by a motor at a 2   Talks excessively at a 3  Blurts out answers at a 3  Cannot wait her turn at a 3  Interrupts others at a 3  Scale interpretation 0 none, mild 1, moderate 2, severe 3    --Wellness exam  COVID vaccine did 3 vaccines declines any further boosters  Tdap 8/23/13 plans on getting this done today  Flu vaccine today   Dental exams overdue since COVID   Eye exams due this year   Sleep Apnea  none  Dermatologist has not gone yet  Exercise  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  Does not want to be on birth control due to low sex drive will use condoms  Patient is a non-smoker.  Pap smears:  10/3/22 hpv neg with ASCUS   9//21/19  Pap and HPV negative,   12/17 HPV + with neg high risk HPV/LGSIL  no colposcopy   Sexually active yes same partner over the past 4 years ; another partner for 2 months no condoms 6/2023  Last colonoscopy no prior colonoscopies colon cancer MGF  Last mammogram did not yet do baseline maternal and paternal aunt breast cancer no family history of ovarian cancer patient has no complaints of breast issues    Wt Readings from Last 6 Encounters:   01/03/24 138 lb (62.6 kg)   12/13/23 136 lb (61.7 kg)   10/03/22 154 lb (69.9 kg)   05/07/22 158 lb (71.7 kg)   05/07/22 150 lb  (68 kg)   22 153 lb (69.4 kg)     Body mass index is 23.69 kg/m².            Current Outpatient Medications   Medication Sig Dispense Refill    lisdexamfetamine (VYVANSE) 30 MG Oral Cap Take 1 capsule (30 mg total) by mouth every morning. 30 capsule 0      Past Medical History:   Diagnosis Date    Allergic rhinitis     Anxiety     COVID-19 virus infection 2020    Depression     Depressive disorder in remission 3/24/2020    PMDD (premenstrual dysphoric disorder)     Vasovagal episode 2020    Vasovagal syncope 2021    at the eye doctor      Past Surgical History:   Procedure Laterality Date      2013    [place]          SCREENING PAP SMEAR BY PHYS  2016      Family History   Problem Relation Age of Onset    Hypertension Father     Heart Attack Father         lates 40's - stent    Heart Surgery Father     High Cholesterol Mother     Cancer Paternal Grandfather         Lung    Colon Cancer Maternal Grandfather     Diabetes Maternal Grandfather     Cancer Maternal Grandmother         Lung    No Known Problems Sister     No Known Problems Sister     No Known Problems Sister     No Known Problems Daughter     No Known Problems Son       Social History:   Social History     Socioeconomic History    Marital status:    Tobacco Use    Smoking status: Never    Smokeless tobacco: Never   Vaping Use    Vaping Use: Never used   Substance and Sexual Activity    Alcohol use: Yes     Alcohol/week: 0.0 standard drinks of alcohol     Comment: special occasions    Drug use: No   Other Topics Concern     Service No    Blood Transfusions No    Caffeine Concern Yes     Comment: 1 cup of coffee and 1 can of soda daily    Occupational Exposure No    Hobby Hazards No    Sleep Concern No    Stress Concern No    Weight Concern No    Special Diet No    Back Care No    Exercise Yes     Comment: active at work    Bike Helmet No    Seat Belt Yes    Self-Exams No     Occ: teacher   . 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 Children: 2.   Exercise:  twice per week.  Diet: watches fats closely and watches sugar closely     REVIEW OF SYSTEMS:   GENERAL: denies fevers, weakness, trouble sleeping or weight changes  SKIN: denies any unusual skin lesions or rashes  EYES:denies vision changes  HEENT: denies upper respiratory symptoms  LUNGS: denies cough or shortness of breath with exertion  CHEST:  denies breast changes or pain  CARDIOVASCULAR: denies chest pain or tightness on exertion: no edema  VASCULAR: denies leg cramps  GI: denies abdominal pain, bowel movement changes, blood in stool  : denies urinary problems, vaginal discharge or discomfort,  periods regular   MUSCULOSKELETAL: denies joint pain or stiffness  NEURO: denies headaches, tingling or dizziness  PSYCHE: denies depression or anxiety  HEMATOLOGIC: denies bleeding abnormalities  ENDOCRINE: denies temperature intolerance, polyuria, or excessive sweating.  LYMPHATICS: denies swollen glands  Psych see above attention issues and anxiety    EXAM:   /66   Pulse 84   Temp 97.2 °F (36.2 °C) (Temporal)   Resp 16   Ht 5' 4\" (1.626 m)   Wt 138 lb (62.6 kg)   LMP 12/09/2023   SpO2 98%   BMI 23.69 kg/m²   Body mass index is 23.69 kg/m².   GENERAL: well developed, well nourished and in no apparent distress  SKIN: no rashes,no suspicious lesions  HEENT: atraumatic, normocephalic,ears, nose and throat are normal  EYES: PERRLA, EOMI, sclera, conjunctiva are clear  NECK: supple,no adenopathy,no carotid bruits  CHEST: no chest tenderness  BREAST: symmetrical, no suspicious mass, no nipple dimpling or discharge.  LUNGS: clear to auscultation bilateral, no rales, rhonchi or wheezing  CARDIO: RRR without murmur normal S1S2  ABD:  normal bowel sounds,soft, non tender, no masses, HSM or tenderness  :external labia, introitus and vagina are normal.  Bimanual exam normal no adnexal masses or cervical motion tenderness,     cervix normal no lesions Pap smear obtained  MUSCULOSKELETAL: gait reymundo,l no gross M/S defect.  EXTREMITIES: no clubbing, cyanosis, or edema  NEURO: oriented times three, cranial nerves are grossly intact, no gross motor or sensory deficit.  Psych mood  mild anxiety.  Her affect is normal and her communication skills are good very pleasant to talk to some evidence of distraction with conversation  ASSESSMENT AND PLAN:   Denisha Roberts is a 38 year old female who presents for a complete physical exam.    Encounter Diagnoses   Name Primary?    Well female exam with routine gynecological exam Yes    Attention deficit hyperactivity disorder (ADHD), predominantly inattentive type     Multiple pigmented nevi     ARI (generalized anxiety disorder)     Encounter for screening for cervical cancer     Encounter for screening for human papillomavirus (HPV)     Screening for gonorrhea     Encounter for screening examination for chlamydial infection     Encounter for lipid screening for cardiovascular disease     Screening for endocrine, nutritional, metabolic and immunity disorder     Visit for screening mammogram     FH: breast cancer     New medication added        Orders Placed This Encounter   Procedures    Hpv Dna  High Risk , Thin Prep Collect    Comp Metabolic Panel (14) [E]    CBC W Differential W Platelet [E]    TSH W Reflex To Free T4 [E]    Lipid Panel [E]    Image-Guided Pap Smear ([facility])    Flulaval 6 months and older, Quadrivalent, Preservative Free [23478]    TdaP (Boostrix) Vaccine (> 7 Y)    ThinPrep PAP Smear    Chlamydia/Gc Amplification [E]       Meds & Refills for this Visit:  Requested Prescriptions     Signed Prescriptions Disp Refills    lisdexamfetamine (VYVANSE) 30 MG Oral Cap 30 capsule 0     Sig: Take 1 capsule (30 mg total) by mouth every morning.       Imaging & Consults:  ELECTROCARDIOGRAM, COMPLETE  FLULAVAL INFLUENZA VACCINE QUAD PRESERVATIVE FREE 0.5 ML  TETANUS, DIPHTHERIA TOXOIDS AND  ACELLULAR PERTUSIS VACCINE (TDAP), >7 YEARS, IM USE  DERM - INTERNAL  [facility] GABE 2D+3D SCREENING BILAT (CPT=77067/39199)  1. Well female exam with routine gynecological exam  Pap and pelvic   .    Self breast exam explained. Health maintenance guidance given including vision and dental exams, vitamin D 1,000 iu daily with calcium 1000 mg daily.  Lifestyle guidance provided recommended low fat diet and aerobic exercise 30 minutes 3-4 times weekly.    Patient had side effects of birth control pills consider copper IUD, condoms vasectomy for partner    2. Attention deficit hyperactivity disorder (ADHD), predominantly inattentive type  Starting her on Vyvanse 30 mg discussed risks including pulmonary hypertension.  Reviewed side effects including chest pain, shortness breath, dizziness, swelling hands and feet, tics, tremors, sleep disturbance, appetite suppression or mood changes such as anxiety or depression.  Contact office if any side effects including  Allergic reaction: Itching or hives, swelling in your face or hands, swelling or tingling in your mouth or throat, chest tightness, trouble breathing   Blurred vision.   Change in behavior, unusual or disturbing thoughts.   Chest pain or shortness of breath.   Confusion, rapid breathing, panic, lightheadedness, or fainting.   Extreme restlessness, fever, shaking or uncontrolled muscle movements.   Fast, pounding, or uneven heartbeat.   Seeing, hearing, or feeling things that are not there.   Seizures.   Tremors or shaking.     Diarrhea, vomiting, or stomach pain.   Dry mouth or bad taste in your mouth.   Feeling tired, irritable, nervous, anxious, or depressed.   Headache or dizziness.   Loss of appetite or weight loss.    Trouble sleeping.     - lisdexamfetamine (VYVANSE) 30 MG Oral Cap; Take 1 capsule (30 mg total) by mouth every morning.  Dispense: 30 capsule; Refill: 0  ECG reviewed normal sinus rhythm  3. Multiple pigmented nevi  - DERM - INTERNAL    4. ARI  (generalized anxiety disorder)  Probably secondary to severe ADD    5. Encounter for screening for cervical cancer  - ThinPrep PAP Smear; Future  - ThinPrep PAP Smear  - Image-Guided Pap Smear ([facility])    6. Encounter for screening for human papillomavirus (HPV)  - Hpv Dna  High Risk , Thin Prep Collect; Future    7. Screening for gonorrhea  - Chlamydia/Gc Amplification [E]; Future  - Chlamydia/Gc Amplification [E]    8. Encounter for screening examination for chlamydial infection  - Chlamydia/Gc Amplification [E]; Future  - Chlamydia/Gc Amplification [E]    9. Encounter for lipid screening for cardiovascular disease  - Lipid Panel [E]; Future    10. Screening for endocrine, nutritional, metabolic and immunity disorder  - Comp Metabolic Panel (14) [E]; Future  - CBC W Differential W Platelet [E]; Future  - TSH W Reflex To Free T4 [E]; Future    11. Visit for screening mammogram  - [facility]ANY GABE 2D+3D SCREENING BILAT (CPT=77067/65923); Future    12. FH: breast cancer  - OSMANY GABE 2D+3D SCREENING BILAT (CPT=77067/23128); Future    13. New medication added  At length discussed the ADD medication patient will follow-up in 1 month    14. Birth control counseling  Patient is considering copper IUD due to side effects with birth control pills  - OBG Referral - [facility] [place])      Time spent was 50 minutes more than 50% was spent on counseling regarding medical conditions and treatment.  Rest of time was spent reviewing chart and charting.     The patient indicates understanding of these issues and agrees to the plan.  Follow-up in 1 month

## 2024-01-04 PROBLEM — E66.3 OVERWEIGHT (BMI 25.0-29.9): Status: RESOLVED | Noted: 2022-10-04 | Resolved: 2024-01-04

## 2024-01-04 PROBLEM — F90.0 ATTENTION DEFICIT HYPERACTIVITY DISORDER (ADHD), PREDOMINANTLY INATTENTIVE TYPE: Status: ACTIVE | Noted: 2024-01-04

## 2024-01-04 PROBLEM — Z80.3 FH: BREAST CANCER: Status: ACTIVE | Noted: 2024-01-04

## 2024-01-04 PROBLEM — R63.5 WEIGHT GAIN: Status: RESOLVED | Noted: 2020-08-17 | Resolved: 2024-01-04

## 2024-01-04 LAB
C TRACH DNA SPEC QL NAA+PROBE: NEGATIVE
N GONORRHOEA DNA SPEC QL NAA+PROBE: NEGATIVE

## 2024-01-04 RX ORDER — LISDEXAMFETAMINE DIMESYLATE CAPSULES 30 MG/1
30 CAPSULE ORAL EVERY MORNING
Qty: 30 CAPSULE | Refills: 0 | Status: SHIPPED | OUTPATIENT
Start: 2024-01-04

## 2024-01-08 LAB
.: NORMAL
.: NORMAL

## 2024-01-22 ENCOUNTER — PATIENT MESSAGE (OUTPATIENT)
Dept: FAMILY MEDICINE CLINIC | Facility: CLINIC | Age: 39
End: 2024-01-22

## 2024-01-22 DIAGNOSIS — N30.00 ACUTE CYSTITIS WITHOUT HEMATURIA: Primary | ICD-10-CM

## 2024-01-22 NOTE — TELEPHONE ENCOUNTER
Pt was treated last month for pyelonephritis. She is again having symptoms of a UTI (see below) and is requesting treatment. How would you like her to proceed? Would you like to see her? LOV 1/3/24. Thanks.

## 2024-01-22 NOTE — TELEPHONE ENCOUNTER
From: Denisha Roberts  To: Rhiannon Haley  Sent: 1/22/2024 6:38 AM CST  Subject: UTI    I have another UTI. It started last Monday. I tried flushing it all week. It’s still there. I think I’ll need an antibiotic.

## 2024-01-23 RX ORDER — NITROFURANTOIN 25; 75 MG/1; MG/1
100 CAPSULE ORAL 2 TIMES DAILY
Qty: 14 CAPSULE | Refills: 0 | Status: SHIPPED | OUTPATIENT
Start: 2024-01-23 | End: 2024-01-30

## 2024-01-26 ENCOUNTER — LAB ENCOUNTER (OUTPATIENT)
Dept: LAB | Age: 39
End: 2024-01-26
Attending: FAMILY MEDICINE
Payer: COMMERCIAL

## 2024-01-26 DIAGNOSIS — N30.00 ACUTE CYSTITIS WITHOUT HEMATURIA: ICD-10-CM

## 2024-01-26 PROCEDURE — 87086 URINE CULTURE/COLONY COUNT: CPT

## 2024-01-30 ENCOUNTER — OFFICE VISIT (OUTPATIENT)
Dept: FAMILY MEDICINE CLINIC | Facility: CLINIC | Age: 39
End: 2024-01-30
Payer: COMMERCIAL

## 2024-01-30 VITALS
RESPIRATION RATE: 16 BRPM | BODY MASS INDEX: 23.56 KG/M2 | DIASTOLIC BLOOD PRESSURE: 70 MMHG | HEIGHT: 64 IN | TEMPERATURE: 98 F | HEART RATE: 84 BPM | SYSTOLIC BLOOD PRESSURE: 110 MMHG | OXYGEN SATURATION: 98 % | WEIGHT: 138 LBS

## 2024-01-30 DIAGNOSIS — R68.89 SENSATION OF SWOLLEN THROAT: Primary | ICD-10-CM

## 2024-01-30 PROCEDURE — 3008F BODY MASS INDEX DOCD: CPT | Performed by: NURSE PRACTITIONER

## 2024-01-30 PROCEDURE — 99213 OFFICE O/P EST LOW 20 MIN: CPT | Performed by: NURSE PRACTITIONER

## 2024-01-30 PROCEDURE — 3078F DIAST BP <80 MM HG: CPT | Performed by: NURSE PRACTITIONER

## 2024-01-30 PROCEDURE — 3074F SYST BP LT 130 MM HG: CPT | Performed by: NURSE PRACTITIONER

## 2024-01-31 NOTE — PROGRESS NOTES
CHIEF COMPLAINT:     Chief Complaint   Patient presents with    Throat Problem     No pain or post nasal drainage.  Feels like tightness at throat area.  S/s for 1 day, pt is clearing her throat more.  No otc meds taken.           HPI:   Denisha Roberts is a 39 year old female presents to clinic with complaint of a sensation of a swollen throat. Patient has had  24  hours. Symptoms have been worsening or any better since onset.  Patient reports following associated symptoms:  feels like a lot of mucous in posterior pharynx/clearing her throat . Patient denies headache. Patient denies nausea.  Patient denies rash.  Patient denies feeling ill.  Reports she has anxiety and started Vyvance about 3-4 weeks ago.  Treating symptoms with nothing.    Current Outpatient Medications   Medication Sig Dispense Refill    nitrofurantoin monohydrate macro 100 MG Oral Cap Take 1 capsule (100 mg total) by mouth 2 (two) times daily for 7 days. 14 capsule 0    lisdexamfetamine (VYVANSE) 30 MG Oral Cap Take 1 capsule (30 mg total) by mouth every morning. 30 capsule 0      Past Medical History:   Diagnosis Date    Allergic rhinitis     Anxiety     COVID-19 virus infection 11/2020    Depression     Depressive disorder in remission 3/24/2020    PMDD (premenstrual dysphoric disorder)     Vasovagal episode 01/02/2020    Vasovagal syncope 03/2021    at the eye doctor      Social History:  Social History     Socioeconomic History    Marital status:    Tobacco Use    Smoking status: Never    Smokeless tobacco: Never   Vaping Use    Vaping Use: Never used   Substance and Sexual Activity    Alcohol use: Yes     Alcohol/week: 0.0 standard drinks of alcohol     Comment: special occasions    Drug use: No   Other Topics Concern     Service No    Blood Transfusions No    Caffeine Concern Yes     Comment: 1 cup of coffee and 1 can of soda daily    Occupational Exposure No    Hobby Hazards No    Sleep Concern No    Stress Concern No     Weight Concern No    Special Diet No    Back Care No    Exercise Yes     Comment: active at work    Bike Helmet No    Seat Belt Yes    Self-Exams No        REVIEW OF SYSTEMS:   GENERAL HEALTH: no change in appetite  SKIN: Per HPI  HEENT: denies ear pain, See HPI  RESPIRATORY: denies shortness of breath or wheezing.  Reports occasional feeling of catching her breath.    CARDIOVASCULAR: denies chest pain or palpitations   GI: denies vomiting or diarrhea  NEURO: denies dizziness or lightheadedness    EXAM:   /70   Pulse 84   Temp 98 °F (36.7 °C) (Oral)   Resp 16   Ht 5' 4\" (1.626 m)   Wt 138 lb (62.6 kg)   LMP 01/07/2024 (Exact Date)   SpO2 98%   BMI 23.69 kg/m²   GENERAL: well developed, well nourished,in no apparent distress  SKIN: no rashes,no suspicious lesions  HEAD: atraumatic, normocephalic  EYES: conjunctiva clear, EOM intact  EARS: TM's clear, non-injected, no bulging, retraction, or fluid bilaterally  NOSE: nostrils patent, no nasal discharge, nasal mucosa not inflamed  THROAT: oral mucosa pink, moist. Posterior pharynx is not erythematous and injected. No swelling, Uvula midline without edema.  no exudates. Tonsils 2/4.  Breath is not malodorous.  + PND  NECK: supple  LUNGS: clear to auscultation bilaterally, no wheezes or rhonchi. Breathing is non labored.  CARDIO: RRR without murmur  GI: good BS's,no masses, hepatosplenomegaly, or tenderness on direct palpation  EXTREMITIES: no cyanosis, clubbing or edema  LYMPH: + anterior cervical. no submandibular lymphadenopathy.  No posterior cervical or occipital lymphadenopathy.    No results found for this or any previous visit (from the past 24 hour(s)).      ASSESSMENT AND PLAN:   Assessment:   Encounter Diagnosis   Name Primary?    Sensation of swollen throat Yes       Plan:    Discussed negative exam findings.  Advised taking Benadryl tonight.  Advised ER for new or worsening symptoms.      The patient/parent indicates understanding of these  issues and agrees to the plan.    Patient Instructions   Benadryl tonight  Go to ER for any new or worsening symptoms

## 2024-02-01 DIAGNOSIS — F90.0 ATTENTION DEFICIT HYPERACTIVITY DISORDER (ADHD), PREDOMINANTLY INATTENTIVE TYPE: ICD-10-CM

## 2024-02-01 RX ORDER — LISDEXAMFETAMINE DIMESYLATE CAPSULES 30 MG/1
30 CAPSULE ORAL EVERY MORNING
Qty: 30 CAPSULE | Refills: 0 | Status: SHIPPED | OUTPATIENT
Start: 2024-02-01

## 2024-02-01 NOTE — TELEPHONE ENCOUNTER
Requested Prescriptions     Pending Prescriptions Disp Refills    lisdexamfetamine (VYVANSE) 30 MG Oral Cap 30 capsule 0     Sig: Take 1 capsule (30 mg total) by mouth every morning.     Last refill: 1/4/24 30 tabs 0 refills    Last Appointment: 1/3/24    Next Appointment: 2/19/24

## 2024-03-01 ENCOUNTER — LAB ENCOUNTER (OUTPATIENT)
Dept: LAB | Age: 39
End: 2024-03-01
Attending: FAMILY MEDICINE
Payer: COMMERCIAL

## 2024-03-01 DIAGNOSIS — Z13.21 SCREENING FOR ENDOCRINE, NUTRITIONAL, METABOLIC AND IMMUNITY DISORDER: ICD-10-CM

## 2024-03-01 DIAGNOSIS — Z13.220 ENCOUNTER FOR LIPID SCREENING FOR CARDIOVASCULAR DISEASE: ICD-10-CM

## 2024-03-01 DIAGNOSIS — Z13.0 SCREENING FOR ENDOCRINE, NUTRITIONAL, METABOLIC AND IMMUNITY DISORDER: ICD-10-CM

## 2024-03-01 DIAGNOSIS — Z13.228 SCREENING FOR ENDOCRINE, NUTRITIONAL, METABOLIC AND IMMUNITY DISORDER: ICD-10-CM

## 2024-03-01 DIAGNOSIS — Z13.6 ENCOUNTER FOR LIPID SCREENING FOR CARDIOVASCULAR DISEASE: ICD-10-CM

## 2024-03-01 DIAGNOSIS — R10.9 ABDOMINAL PAIN, UNSPECIFIED ABDOMINAL LOCATION: ICD-10-CM

## 2024-03-01 DIAGNOSIS — M54.50 LOW BACK PAIN WITHOUT SCIATICA, UNSPECIFIED BACK PAIN LATERALITY, UNSPECIFIED CHRONICITY: ICD-10-CM

## 2024-03-01 DIAGNOSIS — Z13.29 SCREENING FOR ENDOCRINE, NUTRITIONAL, METABOLIC AND IMMUNITY DISORDER: ICD-10-CM

## 2024-03-01 DIAGNOSIS — R39.9 UTI SYMPTOMS: ICD-10-CM

## 2024-03-01 LAB
ALBUMIN SERPL-MCNC: 4.1 G/DL (ref 3.4–5)
ALBUMIN/GLOB SERPL: 1.3 {RATIO} (ref 1–2)
ALP LIVER SERPL-CCNC: 65 U/L
ALT SERPL-CCNC: 22 U/L
ANION GAP SERPL CALC-SCNC: 8 MMOL/L (ref 0–18)
AST SERPL-CCNC: 14 U/L (ref 15–37)
BASOPHILS # BLD AUTO: 0.05 X10(3) UL (ref 0–0.2)
BASOPHILS NFR BLD AUTO: 0.7 %
BILIRUB SERPL-MCNC: 0.4 MG/DL (ref 0.1–2)
BILIRUB UR QL STRIP.AUTO: NEGATIVE
BUN BLD-MCNC: 17 MG/DL (ref 9–23)
CALCIUM BLD-MCNC: 9.2 MG/DL (ref 8.5–10.1)
CHLORIDE SERPL-SCNC: 111 MMOL/L (ref 98–112)
CHOLEST SERPL-MCNC: 165 MG/DL (ref ?–200)
CLARITY UR REFRACT.AUTO: CLEAR
CO2 SERPL-SCNC: 21 MMOL/L (ref 21–32)
CREAT BLD-MCNC: 0.81 MG/DL
EGFRCR SERPLBLD CKD-EPI 2021: 96 ML/MIN/1.73M2 (ref 60–?)
EOSINOPHIL # BLD AUTO: 0.34 X10(3) UL (ref 0–0.7)
EOSINOPHIL NFR BLD AUTO: 4.9 %
ERYTHROCYTE [DISTWIDTH] IN BLOOD BY AUTOMATED COUNT: 11.9 %
FASTING PATIENT LIPID ANSWER: YES
FASTING STATUS PATIENT QL REPORTED: YES
GLOBULIN PLAS-MCNC: 3.2 G/DL (ref 2.8–4.4)
GLUCOSE BLD-MCNC: 93 MG/DL (ref 70–99)
GLUCOSE UR STRIP.AUTO-MCNC: NORMAL MG/DL
HCT VFR BLD AUTO: 38.8 %
HDLC SERPL-MCNC: 66 MG/DL (ref 40–59)
HGB BLD-MCNC: 13.5 G/DL
IMM GRANULOCYTES # BLD AUTO: 0.02 X10(3) UL (ref 0–1)
IMM GRANULOCYTES NFR BLD: 0.3 %
KETONES UR STRIP.AUTO-MCNC: NEGATIVE MG/DL
LDLC SERPL CALC-MCNC: 90 MG/DL (ref ?–100)
LEUKOCYTE ESTERASE UR QL STRIP.AUTO: NEGATIVE
LYMPHOCYTES # BLD AUTO: 2.57 X10(3) UL (ref 1–4)
LYMPHOCYTES NFR BLD AUTO: 36.9 %
MCH RBC QN AUTO: 31.7 PG (ref 26–34)
MCHC RBC AUTO-ENTMCNC: 34.8 G/DL (ref 31–37)
MCV RBC AUTO: 91.1 FL
MONOCYTES # BLD AUTO: 0.56 X10(3) UL (ref 0.1–1)
MONOCYTES NFR BLD AUTO: 8 %
NEUTROPHILS # BLD AUTO: 3.43 X10 (3) UL (ref 1.5–7.7)
NEUTROPHILS # BLD AUTO: 3.43 X10(3) UL (ref 1.5–7.7)
NEUTROPHILS NFR BLD AUTO: 49.2 %
NITRITE UR QL STRIP.AUTO: NEGATIVE
NONHDLC SERPL-MCNC: 99 MG/DL (ref ?–130)
OSMOLALITY SERPL CALC.SUM OF ELEC: 291 MOSM/KG (ref 275–295)
PH UR STRIP.AUTO: 6.5 [PH] (ref 5–8)
PLATELET # BLD AUTO: 310 10(3)UL (ref 150–450)
POTASSIUM SERPL-SCNC: 4.1 MMOL/L (ref 3.5–5.1)
PROT SERPL-MCNC: 7.3 G/DL (ref 6.4–8.2)
PROT UR STRIP.AUTO-MCNC: NEGATIVE MG/DL
RBC # BLD AUTO: 4.26 X10(6)UL
RBC UR QL AUTO: NEGATIVE
SODIUM SERPL-SCNC: 140 MMOL/L (ref 136–145)
SP GR UR STRIP.AUTO: 1.03 (ref 1–1.03)
TRIGL SERPL-MCNC: 42 MG/DL (ref 30–149)
TSI SER-ACNC: 0.92 MIU/ML (ref 0.36–3.74)
UROBILINOGEN UR STRIP.AUTO-MCNC: NORMAL MG/DL
VLDLC SERPL CALC-MCNC: 7 MG/DL (ref 0–30)
WBC # BLD AUTO: 7 X10(3) UL (ref 4–11)

## 2024-03-01 PROCEDURE — 36415 COLL VENOUS BLD VENIPUNCTURE: CPT

## 2024-03-01 PROCEDURE — 87086 URINE CULTURE/COLONY COUNT: CPT

## 2024-03-01 PROCEDURE — 80061 LIPID PANEL: CPT

## 2024-03-01 PROCEDURE — 80053 COMPREHEN METABOLIC PANEL: CPT

## 2024-03-01 PROCEDURE — 84443 ASSAY THYROID STIM HORMONE: CPT

## 2024-03-01 PROCEDURE — 81003 URINALYSIS AUTO W/O SCOPE: CPT

## 2024-03-01 PROCEDURE — 85025 COMPLETE CBC W/AUTO DIFF WBC: CPT

## 2024-03-02 NOTE — PROGRESS NOTES
Blood sugar levels are normal.  Kidney function and liver function tests are normal  Lipid testing is normal.  Thyroid testing is normal  Urinalysis is normal  Red blood cell count white blood cell count and platelet counts are normal

## 2024-04-03 DIAGNOSIS — F90.0 ATTENTION DEFICIT HYPERACTIVITY DISORDER (ADHD), PREDOMINANTLY INATTENTIVE TYPE: ICD-10-CM

## 2024-04-04 RX ORDER — LISDEXAMFETAMINE DIMESYLATE CAPSULES 30 MG/1
30 CAPSULE ORAL DAILY
Qty: 30 CAPSULE | Refills: 0 | Status: SHIPPED | OUTPATIENT
Start: 2024-04-04 | End: 2024-05-04

## 2024-04-04 NOTE — TELEPHONE ENCOUNTER
Requested Prescriptions     Pending Prescriptions Disp Refills    lisdexamfetamine (VYVANSE) 30 MG Oral Cap 30 capsule 0     Sig: Take 1 capsule (30 mg total) by mouth daily.     Last refill: 3/4/24 30 tabs 0 refills    Last Appointment: 2/19/24    Next Appointment: 5/28/24

## 2024-04-06 ENCOUNTER — HOSPITAL ENCOUNTER (OUTPATIENT)
Dept: MAMMOGRAPHY | Age: 39
Discharge: HOME | End: 2024-04-06
Attending: FAMILY MEDICINE
Payer: COMMERCIAL

## 2024-04-06 DIAGNOSIS — Z80.3 FH: BREAST CANCER: ICD-10-CM

## 2024-04-06 DIAGNOSIS — Z12.31 VISIT FOR SCREENING MAMMOGRAM: ICD-10-CM

## 2024-04-06 PROCEDURE — 77063 BREAST TOMOSYNTHESIS BI: CPT | Performed by: FAMILY MEDICINE

## 2024-04-06 PROCEDURE — 77067 SCR MAMMO BI INCL CAD: CPT | Performed by: FAMILY MEDICINE

## 2024-04-08 DIAGNOSIS — R92.343 EXTREMELY DENSE TISSUE OF BOTH BREASTS ON MAMMOGRAPHY: Primary | ICD-10-CM

## 2024-04-08 NOTE — PROGRESS NOTES
Additional views for left breast needed orders placed  Bilateral breast ultrasound needed orders placed

## 2024-04-16 ENCOUNTER — HOSPITAL ENCOUNTER (OUTPATIENT)
Dept: MAMMOGRAPHY | Age: 39
Discharge: HOME | End: 2024-04-16
Attending: FAMILY MEDICINE
Payer: COMMERCIAL

## 2024-04-16 ENCOUNTER — HOSPITAL ENCOUNTER (OUTPATIENT)
Dept: ULTRASOUND IMAGING | Age: 39
Discharge: HOME | End: 2024-04-16
Attending: FAMILY MEDICINE
Payer: COMMERCIAL

## 2024-04-16 DIAGNOSIS — R92.1 BREAST CALCIFICATIONS: Primary | ICD-10-CM

## 2024-04-16 DIAGNOSIS — R92.2 INCONCLUSIVE MAMMOGRAM: ICD-10-CM

## 2024-04-16 DIAGNOSIS — N63.0 BREAST NODULE: ICD-10-CM

## 2024-04-16 PROCEDURE — 77065 DX MAMMO INCL CAD UNI: CPT | Performed by: FAMILY MEDICINE

## 2024-04-16 PROCEDURE — 76641 ULTRASOUND BREAST COMPLETE: CPT | Performed by: FAMILY MEDICINE

## 2024-04-16 PROCEDURE — 77061 BREAST TOMOSYNTHESIS UNI: CPT | Performed by: FAMILY MEDICINE

## 2024-04-24 ENCOUNTER — PATIENT MESSAGE (OUTPATIENT)
Dept: FAMILY MEDICINE CLINIC | Facility: CLINIC | Age: 39
End: 2024-04-24

## 2024-04-24 DIAGNOSIS — R92.1 CALCIFICATION OF LEFT BREAST ON MAMMOGRAPHY: ICD-10-CM

## 2024-04-24 DIAGNOSIS — N63.22 MASS OF UPPER INNER QUADRANT OF LEFT BREAST: Primary | ICD-10-CM

## 2024-04-24 NOTE — TELEPHONE ENCOUNTER
From: Denisha Roberts  To: Rhiannon Haley  Sent: 4/24/2024 7:50 AM CDT  Subject: Mammogram results    Isidoro, Rhiannon! I was curious about my results from the mammogram. I’m not a fan of the phrase “probably benign.” 6 months feels like a long time for me to wait. My mom mentioned a biopsy to check the cysts. What do you think?

## 2024-05-05 ENCOUNTER — PATIENT MESSAGE (OUTPATIENT)
Dept: FAMILY MEDICINE CLINIC | Facility: CLINIC | Age: 39
End: 2024-05-05

## 2024-05-05 DIAGNOSIS — F90.0 ATTENTION DEFICIT HYPERACTIVITY DISORDER (ADHD), PREDOMINANTLY INATTENTIVE TYPE: ICD-10-CM

## 2024-05-06 RX ORDER — LISDEXAMFETAMINE DIMESYLATE 30 MG/1
30 CAPSULE ORAL DAILY
Qty: 30 CAPSULE | Refills: 0 | Status: SHIPPED | OUTPATIENT
Start: 2024-05-06 | End: 2024-06-05

## 2024-05-06 NOTE — TELEPHONE ENCOUNTER
From: Denisha Roberts  To: Rhiannon Haley  Sent: 5/5/2024 12:09 PM CDT  Subject: Refill    Hello!  I don’t have any refills on my Vyvanse 30 mg. Can you send a script to my pharmacy at [facility] at [address] [facility]? Thank you!

## 2024-05-28 ENCOUNTER — OFFICE VISIT (OUTPATIENT)
Dept: FAMILY MEDICINE CLINIC | Facility: CLINIC | Age: 39
End: 2024-05-28

## 2024-05-28 VITALS
SYSTOLIC BLOOD PRESSURE: 102 MMHG | BODY MASS INDEX: 21.51 KG/M2 | HEIGHT: 64 IN | WEIGHT: 126 LBS | TEMPERATURE: 97 F | RESPIRATION RATE: 16 BRPM | DIASTOLIC BLOOD PRESSURE: 56 MMHG | HEART RATE: 71 BPM | OXYGEN SATURATION: 98 %

## 2024-05-28 DIAGNOSIS — F90.0 ATTENTION DEFICIT HYPERACTIVITY DISORDER (ADHD), PREDOMINANTLY INATTENTIVE TYPE: ICD-10-CM

## 2024-05-28 PROCEDURE — 3074F SYST BP LT 130 MM HG: CPT | Performed by: FAMILY MEDICINE

## 2024-05-28 PROCEDURE — 99213 OFFICE O/P EST LOW 20 MIN: CPT | Performed by: FAMILY MEDICINE

## 2024-05-28 PROCEDURE — 3078F DIAST BP <80 MM HG: CPT | Performed by: FAMILY MEDICINE

## 2024-05-28 PROCEDURE — 3008F BODY MASS INDEX DOCD: CPT | Performed by: FAMILY MEDICINE

## 2024-05-28 RX ORDER — LISDEXAMFETAMINE DIMESYLATE 30 MG/1
30 CAPSULE ORAL DAILY
Qty: 30 CAPSULE | Refills: 0 | Status: CANCELLED | OUTPATIENT
Start: 2024-05-28 | End: 2024-06-27

## 2024-05-28 RX ORDER — LISDEXAMFETAMINE DIMESYLATE 30 MG/1
30 CAPSULE ORAL DAILY
Qty: 30 CAPSULE | Refills: 0 | Status: SHIPPED | OUTPATIENT
Start: 2024-06-05 | End: 2024-07-05

## 2024-05-28 RX ORDER — LISDEXAMFETAMINE DIMESYLATE 30 MG/1
30 CAPSULE ORAL DAILY
Qty: 30 CAPSULE | Refills: 0 | Status: SHIPPED | OUTPATIENT
Start: 2024-08-04 | End: 2024-09-03

## 2024-05-28 RX ORDER — LISDEXAMFETAMINE DIMESYLATE 30 MG/1
CAPSULE ORAL
COMMUNITY
Start: 2024-01-04 | End: 2024-05-28

## 2024-05-28 RX ORDER — LISDEXAMFETAMINE DIMESYLATE 30 MG/1
30 CAPSULE ORAL DAILY
Qty: 30 CAPSULE | Refills: 0 | Status: SHIPPED | OUTPATIENT
Start: 2024-07-05 | End: 2024-08-04

## 2024-05-28 NOTE — PROGRESS NOTES
Denisha Roberts is a 39 year old female.  HPI:   ---Attention/ARI issues  Patient is in for follow-up on anxiety and ADD.  At last office visit was started on Vyvanse 30 mg secondary to distractibility and more anxiety.  Vyvanse improved her mood and anxiety level as well as her distraction.    States she is able to start tasks, stay on task and finish tasks with minimal difficulty.  She has noticed that she is able to complete tasks much better, she is remembering things especially conversations that she has feels her self-esteem is improved and feeling less overwhelmed when she has to do more complicated tasks.  No side effects with Vyvanse  Energy is good mood feels stable states anxiety itself has improved  Feels less hyperactive is able to listen more and does not blurt out answers or interrupt people.  She denies any side effects including no chest pain, shortness breath, dizziness, swelling hands and feet, tics, tremors, sleep disturbance or appetite suppression. Mood has been good with no signs of anxiety or depression.    Works as a   is required to multitask finds that her symptoms are more bothersome at home and at work does struggle with her everyday activities but Vyvanse finds both at home and at work is much better with tasks    Adult ADHD-RS-IV with adult prompts  before the Vyvanse was started patient states that most of the symptoms went down to a 0 or 1 after the Vyvanse.  Carelessness at a 2  Difficulty sustaining attention and activities at a 3  Does not listen at a 3   No follow-through at a 3   Cannot organize at a 3  Avoids/dislikes tasks that require sustained mental effort at a 3  Losing important items at a 3   Easily distractible at a 3  Forgetful in daily activities at a 3  Squirms and fidgets at a 2,   Cannot stay seated at a 2  Runs/climbs excessively a 1  Cannot play or work quietly at a 2  On the go or driven by a motor at a 2   Talks excessively at a  3  Blurts out answers at a 3  Cannot wait her turn at a 3  Interrupts others at a 3  Scale interpretation 0 none, mild 1, moderate 2, severe 3                Current Outpatient Medications   Medication Sig Dispense Refill    [START ON 6/5/2024] lisdexamfetamine (VYVANSE) 30 MG Oral Cap Take 1 capsule (30 mg total) by mouth daily. 30 capsule 0    [START ON 7/5/2024] lisdexamfetamine (VYVANSE) 30 MG Oral Cap Take 1 capsule (30 mg total) by mouth daily. 30 capsule 0    [START ON 8/4/2024] lisdexamfetamine (VYVANSE) 30 MG Oral Cap Take 1 capsule (30 mg total) by mouth daily. 30 capsule 0    lisdexamfetamine (VYVANSE) 30 MG Oral Cap Take 1 capsule (30 mg total) by mouth daily. 30 capsule 0      Past Medical History:    Allergic rhinitis    Anxiety    COVID-19 virus infection    Depression    Depressive disorder in remission    PMDD (premenstrual dysphoric disorder)    Vasovagal episode    Vasovagal syncope    at the eye doctor      Social History:  Social History     Socioeconomic History    Marital status:    Tobacco Use    Smoking status: Never    Smokeless tobacco: Never   Vaping Use    Vaping status: Never Used   Substance and Sexual Activity    Alcohol use: Yes     Alcohol/week: 0.0 standard drinks of alcohol     Comment: special occasions    Drug use: No   Other Topics Concern     Service No    Blood Transfusions No    Caffeine Concern Yes     Comment: 1 cup of coffee and 1 can of soda daily    Occupational Exposure No    Hobby Hazards No    Sleep Concern No    Stress Concern No    Weight Concern No    Special Diet No    Back Care No    Exercise Yes     Comment: active at work    Bike Helmet No    Seat Belt Yes    Self-Exams No        REVIEW OF SYSTEMS:   GENERAL HEALTH: feels well otherwise  SKIN: denies any unusual skin lesions or rashes  RESPIRATORY: denies shortness of breath with exertion  CARDIOVASCULAR: denies chest pain on exertion  GI: denies abdominal pain and denies heartburn  NEURO:  denies headaches  Musculoskeletal: No motor deficits  Psych see above  EXAM:   /56   Pulse 71   Temp 97.2 °F (36.2 °C) (Temporal)   Resp 16   Ht 5' 4\" (1.626 m)   Wt 126 lb (57.2 kg)   LMP 05/15/2024   SpO2 98%   BMI 21.63 kg/m²   GENERAL: well developed, well nourished,in no apparent distress  SKIN: no rashes,no suspicious lesions  EYES: PERRLA, EOM intact, sclera clear without injection  NECK: supple,no adenopathy, thyroid normal to palpation  LUNGS: clear to auscultation no rales, rhonchi or wheezes  CARDIO: RRR without murmur  EXTREMITIES: no cyanosis, clubbing or edema  Musculoskeletal: No gross deficit  Neurological: nerves II through XII grossly intact no sensorimotor deficit no tics or tremors  Psychological: Mood happy and very calm her affect is normal.  Good communication skills.  ASSESSMENT AND PLAN:     Encounter Diagnosis   Name Primary?    Attention deficit hyperactivity disorder (ADHD), predominantly inattentive type        No orders of the defined types were placed in this encounter.      Meds & Refills for this Visit:  Requested Prescriptions     Signed Prescriptions Disp Refills    lisdexamfetamine (VYVANSE) 30 MG Oral Cap 30 capsule 0     Sig: Take 1 capsule (30 mg total) by mouth daily.    lisdexamfetamine (VYVANSE) 30 MG Oral Cap 30 capsule 0     Sig: Take 1 capsule (30 mg total) by mouth daily.    lisdexamfetamine (VYVANSE) 30 MG Oral Cap 30 capsule 0     Sig: Take 1 capsule (30 mg total) by mouth daily.       Imaging & Consults:  None  1. Attention deficit hyperactivity disorder (ADHD), predominantly inattentive type  Refill on ADD medication provided patient tolerates well no side effects.  Patient is aware of risks including pulmonary hypertension.  Reviewed side effects including chest pain, shortness breath, dizziness, swelling hands and feet, tics, tremors, sleep disturbance, appetite suppression or mood changes such as anxiety or depression.     - lisdexamfetamine  (VYVANSE) 30 MG Oral Cap; Take 1 capsule (30 mg total) by mouth daily.  Dispense: 30 capsule; Refill: 0  - lisdexamfetamine (VYVANSE) 30 MG Oral Cap; Take 1 capsule (30 mg total) by mouth daily.  Dispense: 30 capsule; Refill: 0  - lisdexamfetamine (VYVANSE) 30 MG Oral Cap; Take 1 capsule (30 mg total) by mouth daily.  Dispense: 30 capsule; Refill: 0  Anxiety symptoms reviewed improved greatly with the Vyvanse we will continue with 30 mg  Time spent was 20 minutes more than 50% was spent on counseling regarding medical conditions and treatment.  Rest of time was spent reviewing chart, reviewing blood work and radiology tests.    The patient indicates understanding of these issues and agrees to the plan.  The patient is asked to return in 3 months.

## 2024-06-07 ENCOUNTER — PATIENT MESSAGE (OUTPATIENT)
Dept: FAMILY MEDICINE CLINIC | Facility: CLINIC | Age: 39
End: 2024-06-07

## 2024-06-10 NOTE — TELEPHONE ENCOUNTER
From: Denisha Roberts  To: Rhiannon Haley  Sent: 6/7/2024 10:58 PM CDT  Subject: Refills     Hello! My pharmacy still says I have 0 refills. Do you know if they received it?

## 2024-09-23 PROBLEM — Z79.899 MEDICATION MANAGEMENT: Status: ACTIVE | Noted: 2024-09-23

## 2024-10-16 ENCOUNTER — HOSPITAL ENCOUNTER (OUTPATIENT)
Dept: MAMMOGRAPHY | Age: 39
Discharge: HOME | End: 2024-10-16
Attending: FAMILY MEDICINE
Payer: COMMERCIAL

## 2024-10-16 ENCOUNTER — HOSPITAL ENCOUNTER (OUTPATIENT)
Dept: ULTRASOUND IMAGING | Age: 39
Discharge: HOME | End: 2024-10-16
Attending: FAMILY MEDICINE
Payer: COMMERCIAL

## 2024-10-16 DIAGNOSIS — N63.0 BREAST NODULE: ICD-10-CM

## 2024-10-16 DIAGNOSIS — Z12.31 ENCOUNTER FOR SCREENING MAMMOGRAM FOR MALIGNANT NEOPLASM OF BREAST: ICD-10-CM

## 2024-10-16 DIAGNOSIS — R92.1 BREAST CALCIFICATIONS: ICD-10-CM

## 2024-10-16 PROCEDURE — 77065 DX MAMMO INCL CAD UNI: CPT | Performed by: FAMILY MEDICINE

## 2024-10-16 PROCEDURE — 77061 BREAST TOMOSYNTHESIS UNI: CPT | Performed by: FAMILY MEDICINE

## 2024-10-16 PROCEDURE — 76642 ULTRASOUND BREAST LIMITED: CPT | Performed by: FAMILY MEDICINE

## 2024-10-17 DIAGNOSIS — R92.8 ABNORMAL MAMMOGRAM OF LEFT BREAST: Primary | ICD-10-CM

## 2024-10-17 DIAGNOSIS — R92.333 HETEROGENEOUSLY DENSE TISSUE OF BOTH BREASTS ON MAMMOGRAPHY: ICD-10-CM

## 2024-10-17 NOTE — PROGRESS NOTES
Benign findings on left diagnostic imaging the radiologist suggests a 6 month follow up mammogram and ultrasound.  Future tests ordered     Due to dense breasts bilaterally you would also benefit from doing a bilateral whole breast screening ultrasound for increased sensitivity for detection of breast cancer which could be obscured secondary to dense breasts.  Order placed if you would like to have this done.    Scheduling Instructions:  To schedule an appointment for your radiology test please call [facility] at [phone].      Sincerely,   Rhiannon Haley PA-C

## 2024-10-19 ENCOUNTER — PATIENT MESSAGE (OUTPATIENT)
Dept: FAMILY MEDICINE CLINIC | Facility: CLINIC | Age: 39
End: 2024-10-19

## 2024-11-27 ENCOUNTER — INITIAL PRENATAL (OUTPATIENT)
Dept: OBGYN CLINIC | Facility: CLINIC | Age: 39
End: 2024-11-27
Payer: COMMERCIAL

## 2024-11-27 ENCOUNTER — ULTRASOUND ENCOUNTER (OUTPATIENT)
Facility: CLINIC | Age: 39
End: 2024-11-27
Payer: COMMERCIAL

## 2024-11-27 VITALS
HEIGHT: 64 IN | WEIGHT: 130.38 LBS | HEART RATE: 91 BPM | BODY MASS INDEX: 22.26 KG/M2 | DIASTOLIC BLOOD PRESSURE: 75 MMHG | SYSTOLIC BLOOD PRESSURE: 118 MMHG

## 2024-11-27 DIAGNOSIS — Z34.90 PRENATAL CARE, ANTEPARTUM (HCC): Primary | ICD-10-CM

## 2024-11-27 DIAGNOSIS — O36.80X0 PREGNANCY WITH INCONCLUSIVE FETAL VIABILITY, SINGLE OR UNSPECIFIED FETUS (HCC): Primary | ICD-10-CM

## 2024-11-27 LAB
APPEARANCE: CLEAR
BILIRUBIN: NEGATIVE
GLUCOSE (URINE DIPSTICK): NEGATIVE MG/DL
KETONES (URINE DIPSTICK): NEGATIVE MG/DL
LEUKOCYTES: NEGATIVE
MULTISTIX LOT#: NORMAL NUMERIC
NITRITE, URINE: NEGATIVE
OCCULT BLOOD: NEGATIVE
PH, URINE: 7 (ref 4.5–8)
PROTEIN (URINE DIPSTICK): NEGATIVE MG/DL
SPECIFIC GRAVITY: 1.01 (ref 1–1.03)
URINE-COLOR: YELLOW
UROBILINOGEN,SEMI-QN: 0.2 MG/DL (ref 0–1.9)

## 2024-11-27 PROCEDURE — 87491 CHLMYD TRACH DNA AMP PROBE: CPT | Performed by: STUDENT IN AN ORGANIZED HEALTH CARE EDUCATION/TRAINING PROGRAM

## 2024-11-27 PROCEDURE — 3074F SYST BP LT 130 MM HG: CPT | Performed by: STUDENT IN AN ORGANIZED HEALTH CARE EDUCATION/TRAINING PROGRAM

## 2024-11-27 PROCEDURE — 81002 URINALYSIS NONAUTO W/O SCOPE: CPT | Performed by: STUDENT IN AN ORGANIZED HEALTH CARE EDUCATION/TRAINING PROGRAM

## 2024-11-27 PROCEDURE — 76801 OB US < 14 WKS SINGLE FETUS: CPT | Performed by: STUDENT IN AN ORGANIZED HEALTH CARE EDUCATION/TRAINING PROGRAM

## 2024-11-27 PROCEDURE — 3008F BODY MASS INDEX DOCD: CPT | Performed by: STUDENT IN AN ORGANIZED HEALTH CARE EDUCATION/TRAINING PROGRAM

## 2024-11-27 PROCEDURE — 87086 URINE CULTURE/COLONY COUNT: CPT | Performed by: STUDENT IN AN ORGANIZED HEALTH CARE EDUCATION/TRAINING PROGRAM

## 2024-11-27 PROCEDURE — 87591 N.GONORRHOEAE DNA AMP PROB: CPT | Performed by: STUDENT IN AN ORGANIZED HEALTH CARE EDUCATION/TRAINING PROGRAM

## 2024-11-27 PROCEDURE — 3078F DIAST BP <80 MM HG: CPT | Performed by: STUDENT IN AN ORGANIZED HEALTH CARE EDUCATION/TRAINING PROGRAM

## 2024-11-27 RX ORDER — CHOLECALCIFEROL (VITAMIN D3) 25 MCG
1 TABLET,CHEWABLE ORAL DAILY
COMMUNITY

## 2024-11-27 NOTE — PATIENT INSTRUCTIONS
Congratulations!     Your Due Date is: Estimated Date of Delivery: 6/6/25     Prenatal labs  -sooner is better   -these labs unfortunately CANNOT be done in our office at this time  -please go to your preferred lab ([facility])    Prenatal vitamin   -make sure it CONTAINS IRON. The gummy vitamins frequently DO NOT CONTAIN IRON.       Genetic screening  2 types to consider - both OPTIONAL:   1) Screening of the fetus for chromosomal disorders (e.g., Down Syndrome):   -Multiple ways to screen for this  -None of these tests are 100% accurate. If an abnormal result is obtained, further testing is advised.   -Most popular are:       () cell free DNA (also called \"NIPS\" or \"non-invasive prenatal screening\")   -blood draw only   -looks for fragments of placental DNA in maternal bloodstream   -placental DNA does NOT always match fetal DNA  -timing: must be at least 10w0d to be drawn   -where: done in our office (or with high risk OB/MFM) - you would need to schedule a \"nurse visit\" for a blood draw if not done at a prenatal visit    2) Screening of the mother   -CAN BE DONE ANYTIME  -done to see if she is a carrier of a mutation that causes a disease, that she could pass along to the fetus. If she is positive for a mutation, generally it is recommended to screen the father of the fetus to see if he is a carrier for the same mutation to determine risk of the fetus having the disease caused by the mutation.   -most common diseases like this (\"recessive\" genetic diseases) are Cystic Fibrosis, Spinal Muscular Atrophy, Sickle Cell Disease or Thalassemia.  -NOTE: all babies are tested at birth for ~30 of the most common of these diseases, so do not necessarily need to test the mother during pregnancy. Testing now is an optional & personal choice      AFP level   There is an optional blood test that we can perform called \"AFP level.\" It is a chemical in the bloodstream produced by the pregnancy. It is done between 15-20 weeks  gestation. The ideal time for testing is actually 16-18 weeks gestation. If the level is abnormally elevated, this can indicate the presence of abnormalities of the development of the brain and spinal cord such as anencephaly (lack of brain development) and spina bifida (exposed spinal cord). These anomalies can generally be seen on ultrasound. It can also be elevated in cases of normal fetal anatomy and can indicate an abnormal placenta. This may or may not be able to be detected on ultrasound. Please think about whether or not you would like to have this test done. When you decide if you would like to have this test done, please let us know. We must enter your exact gestational age and weight on the date of the blood draw for the test to be calculated accurately.      Fetal anatomy scan (\"20 week ultrasound\")  -can be done in our office (schedule with ) if no particular high risk feature or indication  -recommend having done with MFM (Maternal Fetal Medicine aka \"High Risk OB\") if higher risk e.g family history of birth defects, chronic hypertension, diabetes, advanced maternal age over 35, etc.

## 2024-11-27 NOTE — PROGRESS NOTES
Initial OB - 12w5d     Pt feeling well, nausea better    OBhx - 1x C section for breech followed by successful . Uncomplicated pregnancies  PMHx - ADHD, anxiety - stopped all meds. Denies hx HIV, hepatitis, HSV, VTE  Family hx - no family hx birth defects or genetic disorders  Last pap 2024 NILM no HPV done    39 year old , EDC by LMP c/w 12wk US  -NIPT - considering  -carrier screen - considering    1) AMA  -discussed increased rate aneuploidy with age >36yo, pt considering NIPT, not sure  -no other risk factors for preeclampsia, no recommendation for ASA  -L2US  -growth US 32wk  -NSTs 36wk    2) ADHD, anxiety  -used to take vyvanse, buspirone - stopped with pregnancy  -pt doing ok, d/w pt that there are options that are OK during pregnancy if she feels worse

## 2024-11-29 ENCOUNTER — LAB ENCOUNTER (OUTPATIENT)
Dept: LAB | Age: 39
End: 2024-11-29
Attending: FAMILY MEDICINE
Payer: COMMERCIAL

## 2024-11-29 DIAGNOSIS — Z11.51 ENCOUNTER FOR SCREENING FOR HUMAN PAPILLOMAVIRUS (HPV): ICD-10-CM

## 2024-11-29 DIAGNOSIS — Z34.90 PRENATAL CARE, ANTEPARTUM (HCC): ICD-10-CM

## 2024-11-29 LAB
ANTIBODY SCREEN: NEGATIVE
BASOPHILS # BLD AUTO: 0.05 X10(3) UL (ref 0–0.2)
BASOPHILS NFR BLD AUTO: 0.4 %
C TRACH DNA SPEC QL NAA+PROBE: NEGATIVE
EOSINOPHIL # BLD AUTO: 0.08 X10(3) UL (ref 0–0.7)
EOSINOPHIL NFR BLD AUTO: 0.6 %
ERYTHROCYTE [DISTWIDTH] IN BLOOD BY AUTOMATED COUNT: 11.9 %
HBV CORE AB SERPL QL IA: NONREACTIVE
HBV SURFACE AB SER QL: REACTIVE
HBV SURFACE AB SERPL IA-ACNC: 17.73 MIU/ML
HBV SURFACE AG SER-ACNC: <0.1 [IU]/L
HBV SURFACE AG SERPL QL IA: NONREACTIVE
HCT VFR BLD AUTO: 35.9 %
HCV AB SERPL QL IA: NONREACTIVE
HGB BLD-MCNC: 12.2 G/DL
IMM GRANULOCYTES # BLD AUTO: 0.13 X10(3) UL (ref 0–1)
IMM GRANULOCYTES NFR BLD: 1 %
LYMPHOCYTES # BLD AUTO: 2.4 X10(3) UL (ref 1–4)
LYMPHOCYTES NFR BLD AUTO: 17.9 %
MCH RBC QN AUTO: 31.4 PG (ref 26–34)
MCHC RBC AUTO-ENTMCNC: 34 G/DL (ref 31–37)
MCV RBC AUTO: 92.5 FL
MONOCYTES # BLD AUTO: 0.7 X10(3) UL (ref 0.1–1)
MONOCYTES NFR BLD AUTO: 5.2 %
N GONORRHOEA DNA SPEC QL NAA+PROBE: NEGATIVE
NEUTROPHILS # BLD AUTO: 10.06 X10 (3) UL (ref 1.5–7.7)
NEUTROPHILS # BLD AUTO: 10.06 X10(3) UL (ref 1.5–7.7)
NEUTROPHILS NFR BLD AUTO: 74.9 %
PLATELET # BLD AUTO: 344 10(3)UL (ref 150–450)
RBC # BLD AUTO: 3.88 X10(6)UL
RH BLOOD TYPE: POSITIVE
RUBV IGG SER QL: POSITIVE
RUBV IGG SER-ACNC: 171 IU/ML (ref 10–?)
T PALLIDUM AB SER QL IA: NONREACTIVE
WBC # BLD AUTO: 13.4 X10(3) UL (ref 4–11)

## 2024-11-29 PROCEDURE — 86762 RUBELLA ANTIBODY: CPT

## 2024-11-29 PROCEDURE — 36415 COLL VENOUS BLD VENIPUNCTURE: CPT

## 2024-11-29 PROCEDURE — 85025 COMPLETE CBC W/AUTO DIFF WBC: CPT

## 2024-11-29 PROCEDURE — 86706 HEP B SURFACE ANTIBODY: CPT

## 2024-11-29 PROCEDURE — 86704 HEP B CORE ANTIBODY TOTAL: CPT

## 2024-11-29 PROCEDURE — 86780 TREPONEMA PALLIDUM: CPT

## 2024-11-29 PROCEDURE — 86900 BLOOD TYPING SEROLOGIC ABO: CPT

## 2024-11-29 PROCEDURE — 87389 HIV-1 AG W/HIV-1&-2 AB AG IA: CPT

## 2024-11-29 PROCEDURE — 86850 RBC ANTIBODY SCREEN: CPT

## 2024-11-29 PROCEDURE — 86803 HEPATITIS C AB TEST: CPT

## 2024-11-29 PROCEDURE — 86901 BLOOD TYPING SEROLOGIC RH(D): CPT

## 2024-11-29 PROCEDURE — 87340 HEPATITIS B SURFACE AG IA: CPT

## 2024-12-11 ENCOUNTER — TELEPHONE (OUTPATIENT)
Facility: CLINIC | Age: 39
End: 2024-12-11

## 2024-12-11 NOTE — TELEPHONE ENCOUNTER
Patient aware  un-viewed mychart test results from 11/29/24 came back normal. There were no significant abnormalities.  Patient verbalized understanding.

## 2024-12-27 ENCOUNTER — ROUTINE PRENATAL (OUTPATIENT)
Dept: OBGYN CLINIC | Facility: CLINIC | Age: 39
End: 2024-12-27
Payer: COMMERCIAL

## 2024-12-27 VITALS
SYSTOLIC BLOOD PRESSURE: 100 MMHG | WEIGHT: 135.25 LBS | DIASTOLIC BLOOD PRESSURE: 60 MMHG | HEART RATE: 85 BPM | BODY MASS INDEX: 23 KG/M2

## 2024-12-27 DIAGNOSIS — O09.522 AMA (ADVANCED MATERNAL AGE) MULTIGRAVIDA 35+, SECOND TRIMESTER (HCC): Primary | ICD-10-CM

## 2024-12-27 PROCEDURE — 3078F DIAST BP <80 MM HG: CPT

## 2024-12-27 PROCEDURE — 3074F SYST BP LT 130 MM HG: CPT

## 2024-12-27 RX ORDER — FERROUS SULFATE 325(65) MG
325 TABLET, DELAYED RELEASE (ENTERIC COATED) ORAL EVERY OTHER DAY
COMMUNITY

## 2024-12-27 NOTE — PROGRESS NOTES
Gen 17w0d        EDC by LMP c/w 12wk US  -NIPS & Carrier - declines      1) AMCHLOE FAUSTIN discussed increased rate aneuploidy with age >34yo, pt considering NIPT, not sure  -no other risk factors for preeclampsia, no recommendation for ASA  -L2US- discussed and ordered, info given to make an appointment   -growth US 32wk  -NSTs 36wk     2) ADHD, anxiety  -used to take vyvanse, buspirone - stopped with pregnancy  -pt doing ok, d/w pt that there are options that are OK during pregnancy if she feels worse

## 2025-01-16 NOTE — PROGRESS NOTES
Outpatient Maternal-Fetal Medicine Consultation    Dear Dr. Paris,    Thank you for requesting ultrasound evaluation and maternal fetal medicine consultation on your patient Denisha Roberts.  As you are aware she is a 40 year old female with a Sinclair pregnancy at 20w0d.  A maternal-fetal medicine consultation was requested secondary to advanced maternal age, generalized anxiety disorder attention deficit hyperactivity disorder.  Her prenatal records and labs were reviewed.    Prior to pregnancy she had been on Vyvanse and buspirone.  She has stopped these medications for the pregnancy.  Currently her symptoms are controlled without medications.    HISTORY  OB History    Para Term  AB Living   3 2 2 0 0 2   SAB IAB Ectopic Multiple Live Births   0 0 0 0 2     # 1 - Date: 13, Sex: Female, Weight: 8 lb 2 oz (3.685 kg), GA: 37w0d, Type: Caesarean Section, Apgar1: None, Apgar5: None, Living: Living, Birth Comments: None    # 2 - Date: 16, Sex: Male, Weight: 8 lb 8 oz (3.856 kg), GA: 40w0d, Type: Normal spontaneous vaginal delivery, Apgar1: None, Apgar5: None, Living: Living, Birth Comments: None    # 3 - Date: None, Sex: None, Weight: None, GA: None, Type: None, Apgar1: None, Apgar5: None, Living: None, Birth Comments: None    Past Medical History  The patient  has a past medical history of Allergic rhinitis, Anxiety, COVID-19 virus infection (2020), Depression, 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    Past Surgical History  The patient  has a past surgical history that includes  (2013); screening pap smear by phys (); and .    Family History  The patient She indicated that her mother is alive. She indicated that her father is alive. She indicated that all of her three sisters are alive. She indicated that her maternal grandmother is . She  indicated that her maternal grandfather is . She indicated that her paternal grandmother is alive. She indicated that her paternal grandfather is . She indicated that her daughter is alive. She indicated that her son is alive. She indicated that the status of her maternal aunt is unknown. She indicated that the status of her paternal aunt is unknown.      Medications:   Current Outpatient Medications:     Loratadine (CLARITIN OR), , Disp: , Rfl:     ferrous sulfate 325 (65 FE) MG Oral Tab EC, Take 1 tablet (325 mg total) by mouth every other day., Disp: , Rfl:     prenatal vitamin with DHA 27-0.8-228 MG Oral Cap, Take 1 capsule by mouth daily. -Gummy, Disp: , Rfl:     busPIRone 5 MG Oral Tab, Take 1 tablet (5 mg total) by mouth daily with breakfast. (Patient not taking: Reported on 2024), Disp: 30 tablet, Rfl: 1  Allergies: Allergies[1]      PHYSICAL EXAMINATION:  /74 (BP Location: Right arm, Patient Position: Sitting, Cuff Size: adult)   Pulse 97   Ht 5' 4\" (1.626 m)   Wt 143 lb (64.9 kg)   LMP 2024   BMI 24.55 kg/m²   General: alert and oriented,no acute distress  Abdomen: gravid, soft, non-tender  Extremities: non-tender, no edema      OBSTETRIC ULTRASOUND  The patient had a level 2 ultrasound today which I interpreted the results and reviewed them with the patient.    Ultrasound Findings:  Single IUP in cephalic presentation.    Placenta is anterior.   A 3 vessel cord is noted.  Cardiac activity is present at 159 bpm   g ( 0 lb 14 oz);   MVP is 4.3 cm .     The fetal measurements are consistent with the established EDC. No ultrasound evidence of structural abnormalities are seen today. The nasal bone is present. No ultrasound evidence of markers for aneuploidy are seen. She understands that ultrasound exam cannot exclude genetic abnormalities and that genetic testing is recommended. The limitations of ultrasound were discussed.     Uterus and adnexa appeared normal   today on US    See imaging tab for the complete US report.    DISCUSSION  During her visit we discussed and reviewed the following issues:  ADVANCED MATERNAL AGE    Background  I reviewed with the patient that pregnancies in women of advanced maternal age (35 or older at delivery) are associated with elevated risks. Specifically, there is a higher rate of:  Fetal malformations  Preeclampsia  Gestational diabetes  Intrauterine fetal death    As a result, enhanced pregnancy surveillance is advised for these patients including a comprehensive ultrasound to assess for fetal malformations (at 20 weeks) and a third trimester ultrasound assessment for fetal growth (at 32 weeks). In addition, weekly NST's (initiating at 36 weeks gestation for women 35-39 years and at 32 weeks gestation for women 40 years and older) are also advised. Routine obstetric care is more than adequate to assess for gestational diabetes and preeclampsia; hence, no further significant alterations in obstetric care are advised.    Medical Complications    Women 35 years of age or older can expect to experience two to three fold higher rates of hospitalization,  delivery, and pregnancy-related complications when compared to their younger counterparts.  The two most common medical problems complicating these  pregnanccies are hypertension and diabetes.   The incidence of preeclampsia in the general obstetric population is 3 to 4 percent; this increases to 5 to 10 percent in women over age 40 and is as high as 35 percent in women over age 50.   The incidence of gestational diabetes in the general obstetric population is 3 percent, rising to 7 to 12 percent in women over age 40 and 20 percent in women over age 50.  Women 35 years of age or older are more likely to be delivered by . The  delivery rate in the general obstetric population of the [place] is almost 30 percent, compared to almost 50 percent in women over age 40 to  45 and almost 80 percent in women age 50 to 63.          Fetal Death        A decision analysis tool using data from the [place] [facility] predicted a strategy of weekly antepartum testing and labor induction would lower the risk of unexplained fetal death in women 35 years of age or older. In this model, weekly testing starting at 36 weeks of gestation would drop the risk of fetal death from 5.2 to 1.3 per 1000 pregnancies. While a policy of antepartum testing in older women does increase the chance that a women will be induced (71 inductions per fetal death averted) and thereby increases her risk of having a  delivery, only 14 additional cesareans would need to be performed to avert one unexplained fetal death.  Hence, weekly NST's are advised for women of advanced maternal age; testing should be initiated at 36 weeks for women 35-39 years and at 32 weeks for women 40 years and older.    Fetal Malformations    Cardiac malformations, clubfoot, and diaphragmatic hernia appear to occur with increased frequency in offspring of older women. These abnormalities are structural and unrelated to aneuploidy, thus they would not be detected by karyotype analysis.  For these reasons a complete, detailed ultrasound (level II) is advised even if the fetus has a normal karyotype.      Fetal Aneuploidy  We also discussed the increased risk of chromosomal abnormalities associated with advanced maternal age. I reviewed that an ultrasound examination cannot reliably exclude potential genetic abnormalities. Given that the patient will be 40 years old at the time of delivery I reviewed that her risk (at amniocentesis) of having a fetus with any chromosome abnormality is 1:60 and with trisomy 21 is 1: 110.    Invasive Testing  I offered invasive genetic testing (amniocentesis, chorionic villus sampling) after reviewing the diagnostic accuracy of these tests as well as the procedure associated loss rate (1:500  for genetic amniocentesis).    She ultimately does not desire invasive genetic testing and declined aneuploidy screening.     Non-invasive Pregnancy Testing (NIPT)  I reviewed current non-invasive screening options. Currently non-invasive pregnancy testing (NIPT) offers the highest detection rate (with the lowest false positive rate) for the detection of fetal aneuploidy amongst high-risk patients. The limitations of detailed mid-trimester sonography was reviewed with the patient. First trimester screening and second trimester multiple-marker serum serum screening as alternative aneuploidy screening options were also reviewed. However, both of these tests are associated with lower detection and higher false positive rates.    PSYCHIATRIC DISORDERS  DISORDERS IN PREGNANCY     General Principles  Although pregnancy has typically been considered a time of emotional well-being, recent studies suggest that up to 20% of women suffer from mood or anxiety disorders during pregnancy. Particularly vulnerable are those women with histories of psychiatric illness who discontinue psychotropic medications during pregnancy. In a recent study which prospectively followed a group of women with histories of major depression across pregnancy, of the 82 women who maintained antidepressant treatment throughout pregnancy, 21 (26%) relapsed compared with 44 (68%) of the 65 women who discontinued medication. This study estimated that women who discontinued medication were 5 times as likely to relapse as compared to women who maintained treatment.     High rates of relapse have also been observed in women with bipolar disorder. One study indicated that during the course of pregnancy, 70.8% of the women experienced at least one mood episode. The risk of recurrence was significantly higher in women who discontinued treatment with mood stabilizers (85.5%) than those who maintained treatment (37.0%). Although data accumulated over the last 30  years suggest that some medications may be used safely during pregnancy, knowledge regarding the risks of prenatal exposure to psychotropic medications is incomplete. Thus, it is relatively common for patients to discontinue or to avoid pharmacologic treatment during pregnancy.     Decisions regarding the initiation or maintenance of treatment during pregnancy must reflect an understanding of the risks associated with fetal exposure to a particular medication but must also take into consideration the risks associated with untreated psychiatric illness in the mother. Psychiatric illness in the mother is not a benign event and may cause significant morbidity for both the mother and her child; thus, discontinuing or withholding medication during pregnancy is not always the safest option.  Depression and anxiety during pregnancy have been associated with a variety of adverse pregnancy outcomes. Women who suffer from psychiatric illness during pregnancy are less likely to receive adequate prenatal care and are more likely to use alcohol, tobacco, and other substances known to adversely affect pregnancy outcomes. Several studies have described low birth weight and fetal growth retardation in children born to depressed mothers.  delivery is another potential pregnancy complication among women experiencing distress during pregnancy. Pregnancy complications related to maternal depression and anxiety in late pregnancy have also been described, including an increased risk for having pre-eclampsia, operative delivery, and infant admission to a special care nursery for a variety of conditions including respiratory distress, hypoglycemia, and prematurity. These data underscore the need to perform a thorough risk/benefit analysis of pregnant women with psychiatric illness, including evaluating the impact of untreated illness on the baby and the mother, as well as the risks of using medication during pregnancy.     Risk of  Teratogenesis  The baseline incidence of major congenital malformations in newborns born in the [place] is estimated to be between 2 and 4%. During the earliest stages of pregnancy, formation of major organ systems takes place and is complete within the first 12 weeks after conception. Therefore, discussion around risks of exposures during pregnancy may be broken down by the timing of exposure or trimester, with particular vigilance around first trimester exposures.     A teratogen is defined as an agent that interferes the in utero development process and produces some type of organ malformation or dysfunction. For each organ or organ system, there exists a critical period during which development takes place and is susceptible to the effects of a teratogen. For example, neural tube folding and closure, forming the brain and spinal cord, occur within the first four weeks of gestation. Most of the formation of the heart and great vessels takes place from four to nine weeks after conception, although the entire first trimester is often considered pertinent.     Risk of  Symptoms   toxicity or  syndromes (sometimes referred to as  “withdrawal”) refer to a spectrum of physical and behavioral symptoms observed in the acute  period that can be attributed to drug exposure at or near the time of delivery. Anecdotal reports that attribute these syndromes to drug exposure must be cautiously interpreted, and larger samples must be studied in order to establish a causal link between exposure to a particular medication and a  syndrome.     Risk of Long-Term Effects  Although the data suggest that some medications may be used safely during pregnancy if clinically warranted, our knowledge regarding the long-term effects of prenatal exposure to psychotropic medications is incomplete. Because neuronal migration and differentiation occur throughout pregnancy and into the early  years of life, the central nervous system (CNS) remains particularly vulnerable to toxic agents throughout pregnancy. While exposures to teratogens early in pregnancy may result in clear abnormalities, exposures that occur after neural tube closure (at 32 days of gestation) may produce more subtle changes in behavior and functioning.     Vyvance: Lisdexamfetamine is converted to dextroamphetamine. The majority of human data is based on illicit amphetamine/methamphetamine exposure and not from therapeutic maternal use. Use of amphetamines during pregnancy may lead to an increased risk of premature birth and low birth weight; newborns may experience symptoms of withdrawal. Behavioral problems may also occur later in childhood.    Buspirone: Buspirone has not been adequately studied in pregnant women.  There are no adequate or well-controlled studies in pregnant women.  There is no evidence of fetal  harm in rat and rabbit studies in which the animals were on much higher doses than the max recommended human dose.    Therefore based on her symptoms, I would support her taking this medication during pregnancy under the guidance of a psychiatrist.    We discussed the recommended plan of care based on her  risk factors.  Denisha and her significant other, Aj, had their questions answered to their satisfaction.      IMPRESSION:  IUP at 20w0d  Normal level 2 ultrasound  Advanced maternal age, aneuploidy and screening and diagnostic testing has been declined  Anxiety  ADHD      RECOMMENDATIONS:  Continue care with Dr. Paris  She should continue to follow with her behavioral health care team.  If medications are needed there are safer options to use in pregnancy  Follow-up growth and BPP at 32 weeks  Weekly NST at 36 weeks    Total time spent 55 minutes this calendar day which includes preparing to see the patient including chart review, obtaining and/or reviewing additional medical history, performing a physical  exam and evaluation, documenting clinical information in the electronic medical record, independently interpreting results, counseling the patient, communicating results to the patient/family/caregiver and coordinating care.     Case discussed with patient who demonstrated understanding and agreement with plan.     Thank you for allowing me to participate in the care of this patient.  Please feel free to contact me with any questions.    Nanda Cid MD  Maternal-Fetal Medicine       Note to patient and family:  The 21st Century Cures Act makes medical notes available to patients in the interest of transparency.  However, please be advised that this is a medical document.  It is intended as a peer to peer communication.  It is written in medical language and may contain abbreviations or verbiage that are technical and unfamiliar.  It may appear blunt or direct.  Medical documents are intended to carry relevant information, facts as evident, and the clinical opinion of the practitioner.         [1]   Allergies  Allergen Reactions    Avocado RASH and ITCHING    Dust UNKNOWN    Ragweed UNKNOWN

## 2025-01-17 ENCOUNTER — ULTRASOUND ENCOUNTER (OUTPATIENT)
Dept: PERINATAL CARE | Facility: HOSPITAL | Age: 40
End: 2025-01-17
Attending: OBSTETRICS & GYNECOLOGY
Payer: COMMERCIAL

## 2025-01-17 ENCOUNTER — ROUTINE PRENATAL (OUTPATIENT)
Facility: CLINIC | Age: 40
End: 2025-01-17
Payer: COMMERCIAL

## 2025-01-17 ENCOUNTER — OFFICE VISIT (OUTPATIENT)
Dept: PERINATAL CARE | Facility: HOSPITAL | Age: 40
End: 2025-01-17
Payer: COMMERCIAL

## 2025-01-17 VITALS
HEART RATE: 97 BPM | BODY MASS INDEX: 24.41 KG/M2 | SYSTOLIC BLOOD PRESSURE: 131 MMHG | HEIGHT: 64 IN | DIASTOLIC BLOOD PRESSURE: 74 MMHG | WEIGHT: 143 LBS

## 2025-01-17 VITALS
HEART RATE: 77 BPM | BODY MASS INDEX: 24.41 KG/M2 | HEIGHT: 64 IN | DIASTOLIC BLOOD PRESSURE: 74 MMHG | SYSTOLIC BLOOD PRESSURE: 118 MMHG | WEIGHT: 143 LBS

## 2025-01-17 DIAGNOSIS — O09.522 MULTIGRAVIDA OF ADVANCED MATERNAL AGE IN SECOND TRIMESTER (HCC): ICD-10-CM

## 2025-01-17 DIAGNOSIS — Z34.82 PRENATAL CARE, SUBSEQUENT PREGNANCY IN SECOND TRIMESTER (HCC): Primary | ICD-10-CM

## 2025-01-17 DIAGNOSIS — O09.529 AMA (ADVANCED MATERNAL AGE) MULTIGRAVIDA 35+ (HCC): ICD-10-CM

## 2025-01-17 DIAGNOSIS — F41.1 GAD (GENERALIZED ANXIETY DISORDER): ICD-10-CM

## 2025-01-17 DIAGNOSIS — Z3A.20 20 WEEKS GESTATION OF PREGNANCY (HCC): ICD-10-CM

## 2025-01-17 DIAGNOSIS — F90.0 ATTENTION DEFICIT HYPERACTIVITY DISORDER (ADHD), PREDOMINANTLY INATTENTIVE TYPE: ICD-10-CM

## 2025-01-17 DIAGNOSIS — O09.529 AMA (ADVANCED MATERNAL AGE) MULTIGRAVIDA 35+ (HCC): Primary | ICD-10-CM

## 2025-01-17 PROCEDURE — 76811 OB US DETAILED SNGL FETUS: CPT | Performed by: OBSTETRICS & GYNECOLOGY

## 2025-01-17 PROCEDURE — 3078F DIAST BP <80 MM HG: CPT | Performed by: OBSTETRICS & GYNECOLOGY

## 2025-01-17 PROCEDURE — 3008F BODY MASS INDEX DOCD: CPT | Performed by: OBSTETRICS & GYNECOLOGY

## 2025-01-17 PROCEDURE — 3074F SYST BP LT 130 MM HG: CPT | Performed by: OBSTETRICS & GYNECOLOGY

## 2025-01-17 NOTE — PROGRESS NOTES
20wk  Patient has no complaints    EDC by LMP c/w 12wk US  -NIPS & Carrier - declines      1) AYUSH FAUSTIN discussed increased rate aneuploidy with age >36yo, pt considering NIPT, not sure  -no other risk factors for preeclampsia, no recommendation for ASA  -L2US- nl BOY  -growth US 32wk  -NSTs 36wk     2) ADHD, anxiety  -used to take vyvanse, buspirone - stopped with pregnancy  -pt doing ok, d/w pt that there are options that are OK during pregnancy if she feels worse

## 2025-02-15 ENCOUNTER — ROUTINE PRENATAL (OUTPATIENT)
Facility: CLINIC | Age: 40
End: 2025-02-15
Payer: COMMERCIAL

## 2025-02-15 VITALS
BODY MASS INDEX: 25.95 KG/M2 | WEIGHT: 152 LBS | HEART RATE: 80 BPM | SYSTOLIC BLOOD PRESSURE: 112 MMHG | HEIGHT: 64 IN | DIASTOLIC BLOOD PRESSURE: 58 MMHG

## 2025-02-15 DIAGNOSIS — O09.522 AMA (ADVANCED MATERNAL AGE) MULTIGRAVIDA 35+, SECOND TRIMESTER (HCC): Primary | ICD-10-CM

## 2025-02-15 DIAGNOSIS — Z13.1 DIABETES MELLITUS SCREENING: ICD-10-CM

## 2025-02-15 DIAGNOSIS — Z13.0 SCREENING FOR DEFICIENCY ANEMIA: ICD-10-CM

## 2025-02-15 PROCEDURE — 3078F DIAST BP <80 MM HG: CPT

## 2025-02-15 PROCEDURE — 3074F SYST BP LT 130 MM HG: CPT

## 2025-02-15 PROCEDURE — 3008F BODY MASS INDEX DOCD: CPT

## 2025-02-15 NOTE — PROGRESS NOTES
Gen 24w1d    She is doing well, no complaints  -1hr gTT & CBC discussed and ordered     EDC by LMP c/w 12wk US  -NIPS & Carrier - declines      1) AMA  -NIPS - declines   -no other risk factors for preeclampsia, no recommendation for ASA  -L2US- normal   -growth US 32wk & BPP   -NSTs 36wk     2) ADHD, anxiety  -used to take vyvanse, buspirone - stopped with pregnancy  -pt doing ok, d/w pt that there are options that are OK during pregnancy if she feels worse

## 2025-02-25 ENCOUNTER — TELEPHONE (OUTPATIENT)
Facility: CLINIC | Age: 40
End: 2025-02-25

## 2025-03-03 ENCOUNTER — ROUTINE PRENATAL (OUTPATIENT)
Facility: CLINIC | Age: 40
End: 2025-03-03
Payer: COMMERCIAL

## 2025-03-03 ENCOUNTER — LAB ENCOUNTER (OUTPATIENT)
Dept: LAB | Age: 40
End: 2025-03-03
Payer: COMMERCIAL

## 2025-03-03 VITALS
HEART RATE: 94 BPM | BODY MASS INDEX: 26.46 KG/M2 | SYSTOLIC BLOOD PRESSURE: 108 MMHG | WEIGHT: 155 LBS | DIASTOLIC BLOOD PRESSURE: 62 MMHG | HEIGHT: 64 IN

## 2025-03-03 DIAGNOSIS — Z13.0 SCREENING FOR DEFICIENCY ANEMIA: ICD-10-CM

## 2025-03-03 DIAGNOSIS — Z3A.26 26 WEEKS GESTATION OF PREGNANCY (HCC): ICD-10-CM

## 2025-03-03 DIAGNOSIS — Z13.1 DIABETES MELLITUS SCREENING: ICD-10-CM

## 2025-03-03 DIAGNOSIS — Z34.82 PRENATAL CARE, SUBSEQUENT PREGNANCY IN SECOND TRIMESTER (HCC): Primary | ICD-10-CM

## 2025-03-03 LAB
BASOPHILS # BLD AUTO: 0.06 X10(3) UL (ref 0–0.2)
BASOPHILS NFR BLD AUTO: 0.3 %
EOSINOPHIL # BLD AUTO: 0.13 X10(3) UL (ref 0–0.7)
EOSINOPHIL NFR BLD AUTO: 0.7 %
ERYTHROCYTE [DISTWIDTH] IN BLOOD BY AUTOMATED COUNT: 12.8 %
GLUCOSE 1H P GLC SERPL-MCNC: 147 MG/DL
HCT VFR BLD AUTO: 38 %
HGB BLD-MCNC: 12.8 G/DL
IMM GRANULOCYTES # BLD AUTO: 0.34 X10(3) UL (ref 0–1)
IMM GRANULOCYTES NFR BLD: 1.8 %
LYMPHOCYTES # BLD AUTO: 2.89 X10(3) UL (ref 1–4)
LYMPHOCYTES NFR BLD AUTO: 15.3 %
MCH RBC QN AUTO: 31.9 PG (ref 26–34)
MCHC RBC AUTO-ENTMCNC: 33.7 G/DL (ref 31–37)
MCV RBC AUTO: 94.8 FL
MONOCYTES # BLD AUTO: 0.82 X10(3) UL (ref 0.1–1)
MONOCYTES NFR BLD AUTO: 4.3 %
NEUTROPHILS # BLD AUTO: 14.7 X10 (3) UL (ref 1.5–7.7)
NEUTROPHILS # BLD AUTO: 14.7 X10(3) UL (ref 1.5–7.7)
NEUTROPHILS NFR BLD AUTO: 77.6 %
PLATELET # BLD AUTO: 297 10(3)UL (ref 150–450)
RBC # BLD AUTO: 4.01 X10(6)UL
WBC # BLD AUTO: 18.9 X10(3) UL (ref 4–11)

## 2025-03-03 PROCEDURE — 82950 GLUCOSE TEST: CPT

## 2025-03-03 PROCEDURE — 85025 COMPLETE CBC W/AUTO DIFF WBC: CPT

## 2025-03-03 NOTE — PROGRESS NOTES
26w2d  Patient has no complaints    -1hr gTT & CBC done today, results pending    BOY    EDC by LMP c/w 12wk US   -NIPS & Carrier - declines      1) AMA  -NIPS - declines   -no other risk factors for preeclampsia, no recommendation for ASA  -L2US- normal   -growth US 32wk & BPP   -NSTs 36wk     2) ADHD, anxiety  -used to take vyvanse, buspirone - stopped with pregnancy  -pt doing ok, d/w pt that there are options that are OK during pregnancy if she feels worse

## 2025-03-04 DIAGNOSIS — O99.810 ABNORMAL MATERNAL GLUCOSE TOLERANCE, ANTEPARTUM (HCC): Primary | ICD-10-CM

## 2025-03-04 NOTE — PROGRESS NOTES
Pt has read FAB BAG message with results & recommendations. Scheduled 3 hr gtt 3/17/25. To call if she has any questions.

## 2025-03-18 ENCOUNTER — PATIENT MESSAGE (OUTPATIENT)
Dept: OBGYN CLINIC | Facility: CLINIC | Age: 40
End: 2025-03-18

## 2025-03-18 ENCOUNTER — TELEPHONE (OUTPATIENT)
Facility: CLINIC | Age: 40
End: 2025-03-18

## 2025-03-19 NOTE — TELEPHONE ENCOUNTER
Message left per HIPAA form letting patient know her pregnancy confirmation letter has been sent to her MyChart. To call if any questions.

## 2025-03-20 ENCOUNTER — ROUTINE PRENATAL (OUTPATIENT)
Facility: CLINIC | Age: 40
End: 2025-03-20
Payer: COMMERCIAL

## 2025-03-20 VITALS
BODY MASS INDEX: 27.55 KG/M2 | HEIGHT: 64 IN | SYSTOLIC BLOOD PRESSURE: 106 MMHG | HEART RATE: 89 BPM | DIASTOLIC BLOOD PRESSURE: 60 MMHG | WEIGHT: 161.38 LBS

## 2025-03-20 DIAGNOSIS — Z23 NEED FOR TDAP VACCINATION: ICD-10-CM

## 2025-03-20 DIAGNOSIS — O09.523 AMA (ADVANCED MATERNAL AGE) MULTIGRAVIDA 35+, THIRD TRIMESTER (HCC): Primary | ICD-10-CM

## 2025-03-20 DIAGNOSIS — Z11.3 ROUTINE SCREENING FOR STI (SEXUALLY TRANSMITTED INFECTION): ICD-10-CM

## 2025-03-20 PROCEDURE — 3074F SYST BP LT 130 MM HG: CPT

## 2025-03-20 PROCEDURE — 90471 IMMUNIZATION ADMIN: CPT

## 2025-03-20 PROCEDURE — 3008F BODY MASS INDEX DOCD: CPT

## 2025-03-20 PROCEDURE — 90715 TDAP VACCINE 7 YRS/> IM: CPT

## 2025-03-20 PROCEDURE — 3078F DIAST BP <80 MM HG: CPT

## 2025-03-20 NOTE — PROGRESS NOTES
Gen 28w6d     She is doing well, no complaints  -tdap given today   -3rd tri HIV & T pal discussed and ordered.     EDC by LMP c/w 12wk US   -NIPS & Carrier - declines   -1hr elevated, 3hr gtt -ordered - has lab appt 3/27/25     1) AMA  -NIPS - declines   -no other risk factors for preeclampsia, no recommendation for ASA  -L2US- normal   -growth US 32wk & BPP -ordered, encouraged to make an appointment   -NSTs 36wk     2) ADHD, anxiety  -used to take vyvanse, buspirone - stopped with pregnancy  -pt doing ok, d/w pt that there are options that are OK during pregnancy if she feels worse

## 2025-03-27 ENCOUNTER — LABORATORY ENCOUNTER (OUTPATIENT)
Dept: LAB | Age: 40
End: 2025-03-27
Payer: COMMERCIAL

## 2025-03-27 DIAGNOSIS — O99.810 ABNORMAL MATERNAL GLUCOSE TOLERANCE, ANTEPARTUM (HCC): ICD-10-CM

## 2025-03-27 DIAGNOSIS — Z11.3 ROUTINE SCREENING FOR STI (SEXUALLY TRANSMITTED INFECTION): ICD-10-CM

## 2025-03-27 LAB
GLUCOSE 1H P GLC SERPL-MCNC: 139 MG/DL
GLUCOSE 2H P GLC SERPL-MCNC: 119 MG/DL
GLUCOSE 3H P GLC SERPL-MCNC: 91 MG/DL (ref 70–140)
GLUCOSE P FAST SERPL-MCNC: 78 MG/DL
T PALLIDUM AB SER QL IA: NONREACTIVE

## 2025-03-27 PROCEDURE — 82951 GLUCOSE TOLERANCE TEST (GTT): CPT

## 2025-03-27 PROCEDURE — 87389 HIV-1 AG W/HIV-1&-2 AB AG IA: CPT

## 2025-03-27 PROCEDURE — 86780 TREPONEMA PALLIDUM: CPT

## 2025-03-27 PROCEDURE — 82952 GTT-ADDED SAMPLES: CPT

## 2025-04-04 ENCOUNTER — ROUTINE PRENATAL (OUTPATIENT)
Facility: CLINIC | Age: 40
End: 2025-04-04
Payer: COMMERCIAL

## 2025-04-04 VITALS
BODY MASS INDEX: 27.49 KG/M2 | SYSTOLIC BLOOD PRESSURE: 122 MMHG | DIASTOLIC BLOOD PRESSURE: 66 MMHG | WEIGHT: 161 LBS | HEIGHT: 64 IN | HEART RATE: 101 BPM

## 2025-04-04 DIAGNOSIS — Z34.80 PRENATAL CARE, SUBSEQUENT PREGNANCY, ANTEPARTUM (HCC): Primary | ICD-10-CM

## 2025-04-04 DIAGNOSIS — Z3A.31 31 WEEKS GESTATION OF PREGNANCY (HCC): ICD-10-CM

## 2025-04-04 DIAGNOSIS — O09.523 AMA (ADVANCED MATERNAL AGE) MULTIGRAVIDA 35+, THIRD TRIMESTER (HCC): ICD-10-CM

## 2025-04-04 PROCEDURE — 3008F BODY MASS INDEX DOCD: CPT | Performed by: STUDENT IN AN ORGANIZED HEALTH CARE EDUCATION/TRAINING PROGRAM

## 2025-04-04 PROCEDURE — 3078F DIAST BP <80 MM HG: CPT | Performed by: STUDENT IN AN ORGANIZED HEALTH CARE EDUCATION/TRAINING PROGRAM

## 2025-04-04 PROCEDURE — 3074F SYST BP LT 130 MM HG: CPT | Performed by: STUDENT IN AN ORGANIZED HEALTH CARE EDUCATION/TRAINING PROGRAM

## 2025-04-04 NOTE — PROGRESS NOTES
Gen 31.0    +FM, no LOF, no VB, no ctx     EDC by LMP c/w 12wk US   -NIPS & Carrier - declines   -1hr elevated, 3hr gtt nl  - HIV, T pal and Tdap done      1) AMA  -NIPS - declines   -no other risk factors for preeclampsia, no recommendation for ASA  -L2US- normal   -growth US 32wk & BPP -04/21/2025  -NSTs 36wk     2) ADHD, anxiety  -used to take vyvanse, buspirone - stopped with pregnancy  -pt doing ok, d/w pt that there are options that are OK during pregnancy if she feels worse  -decline therapy     3) BIRADS 3 on mammogram  - 2%risk of malignancy.  encouraged to make follow up mammogram per recommendations - ok to do in pregnancy, but can discuss with radiology   - R/B discussed

## 2025-04-19 ENCOUNTER — ROUTINE PRENATAL (OUTPATIENT)
Facility: CLINIC | Age: 40
End: 2025-04-19
Payer: COMMERCIAL

## 2025-04-19 VITALS
HEIGHT: 64 IN | BODY MASS INDEX: 28.48 KG/M2 | WEIGHT: 166.81 LBS | SYSTOLIC BLOOD PRESSURE: 116 MMHG | DIASTOLIC BLOOD PRESSURE: 76 MMHG | HEART RATE: 94 BPM

## 2025-04-19 DIAGNOSIS — Z3A.33 33 WEEKS GESTATION OF PREGNANCY (HCC): ICD-10-CM

## 2025-04-19 DIAGNOSIS — Z34.83 PRENATAL CARE, SUBSEQUENT PREGNANCY IN THIRD TRIMESTER (HCC): Primary | ICD-10-CM

## 2025-04-19 PROCEDURE — 3008F BODY MASS INDEX DOCD: CPT | Performed by: OBSTETRICS & GYNECOLOGY

## 2025-04-19 PROCEDURE — 3078F DIAST BP <80 MM HG: CPT | Performed by: OBSTETRICS & GYNECOLOGY

## 2025-04-19 PROCEDURE — 3074F SYST BP LT 130 MM HG: CPT | Performed by: OBSTETRICS & GYNECOLOGY

## 2025-04-19 NOTE — PROGRESS NOTES
33w1d    Patient has no complaints    EDC by LMP c/w 12wk US   -NIPS & Carrier - declines   -1hr elevated, 3hr gtt nl  - HIV, T pal and Tdap done      1) AMA  -NIPS - declines   -no other risk factors for preeclampsia, no recommendation for ASA  -L2US- normal   -growth US 32wk & BPP -04/21/2025  -NSTs 36wk     2) ADHD, anxiety  -used to take vyvanse, buspirone - stopped with pregnancy  -pt doing ok, d/w pt that there are options that are OK during pregnancy if she feels worse  -decline therapy     3) BIRADS 3 on mammogram  - 2%risk of malignancy.  encouraged to make follow up mammogram per recommendations - ok to do in pregnancy, but can discuss with radiology   - R/B discussed

## 2025-04-21 ENCOUNTER — ULTRASOUND ENCOUNTER (OUTPATIENT)
Dept: PERINATAL CARE | Facility: HOSPITAL | Age: 40
End: 2025-04-21
Attending: OBSTETRICS & GYNECOLOGY
Payer: COMMERCIAL

## 2025-04-21 VITALS
BODY MASS INDEX: 28.34 KG/M2 | HEIGHT: 64 IN | HEART RATE: 88 BPM | WEIGHT: 166 LBS | DIASTOLIC BLOOD PRESSURE: 81 MMHG | SYSTOLIC BLOOD PRESSURE: 123 MMHG

## 2025-04-21 DIAGNOSIS — O09.523 MULTIGRAVIDA OF ADVANCED MATERNAL AGE IN THIRD TRIMESTER (HCC): Primary | ICD-10-CM

## 2025-04-21 DIAGNOSIS — O09.523 MULTIGRAVIDA OF ADVANCED MATERNAL AGE IN THIRD TRIMESTER (HCC): ICD-10-CM

## 2025-04-21 PROCEDURE — 76819 FETAL BIOPHYS PROFIL W/O NST: CPT

## 2025-04-21 PROCEDURE — 76816 OB US FOLLOW-UP PER FETUS: CPT | Performed by: OBSTETRICS & GYNECOLOGY

## 2025-04-21 NOTE — PROGRESS NOTES
Outpatient Maternal-Fetal Medicine Consultation    Dear Dr. Paris,    Thank you for requesting ultrasound evaluation and maternal fetal medicine consultation on your patient Denisha Roberts.  As you are aware she is a 40 year old female with a Sinclair pregnancy.  A maternal-fetal medicine consultation was requested secondary to advanced maternal age, generalized anxiety disorder attention deficit hyperactivity disorder.  Her prenatal records and labs were reviewed.    Prior to pregnancy she had been on Vyvanse and buspirone.  She has stopped these medications for the pregnancy.  Currently her symptoms are controlled without medications.    HISTORY  OB History    Para Term  AB Living   3 2 2 0 0 2   SAB IAB Ectopic Multiple Live Births   0 0 0 0 2     # 1 - Date: 13, Sex: Female, Weight: 8 lb 2 oz (3.685 kg), GA: 37w0d, Type: Caesarean Section, Apgar1: None, Apgar5: None, Living: Living, Birth Comments: None    # 2 - Date: 16, Sex: Male, Weight: 8 lb 8 oz (3.856 kg), GA: 40w0d, Type: Normal spontaneous vaginal delivery, Apgar1: None, Apgar5: None, Living: Living, Birth Comments: None    # 3 - Date: None, Sex: None, Weight: None, GA: None, Type: None, Apgar1: None, Apgar5: None, Living: None, Birth Comments: None    Past Medical History  The patient  has a past medical history of Allergic rhinitis, Anxiety, COVID-19 virus infection (2020), Depression, 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    Past Surgical History  The patient  has a past surgical history that includes  (2013); screening pap smear by phys (); and .    Family History  The patient She indicated that her mother is alive. She indicated that her father is alive. She indicated that all of her three sisters are alive. She indicated that her maternal grandmother is . She  indicated that her maternal grandfather is . She indicated that her paternal grandmother is alive. She indicated that her paternal grandfather is . She indicated that her daughter is alive. She indicated that her son is alive. She indicated that the status of her maternal aunt is unknown. She indicated that the status of her paternal aunt is unknown.      Medications:   Current Outpatient Medications:     Loratadine (CLARITIN OR), , Disp: , Rfl:     ferrous sulfate 325 (65 FE) MG Oral Tab EC, Take 1 tablet (325 mg total) by mouth every other day., Disp: , Rfl:     prenatal vitamin with DHA 27-0.8-228 MG Oral Cap, Take 1 capsule by mouth in the morning. -Gummy., Disp: , Rfl:     busPIRone 5 MG Oral Tab, Take 1 tablet (5 mg total) by mouth daily with breakfast. (Patient not taking: Reported on 2025), Disp: 30 tablet, Rfl: 1  Allergies: Allergies[1]      PHYSICAL EXAMINATION:  /81 (BP Location: Right arm, Patient Position: Sitting, Cuff Size: adult)   Pulse 88   Ht 5' 4\" (1.626 m)   Wt 166 lb (75.3 kg)   LMP 2024   BMI 28.49 kg/m²   General: alert and oriented,no acute distress  Abdomen: gravid, soft, non-tender  Extremities: non-tender, no edema        DISCUSSION  During her visit we discussed and reviewed the following issues:  ADVANCED MATERNAL AGE    Background  I reviewed with the patient that pregnancies in women of advanced maternal age (35 or older at delivery) are associated with elevated risks. Specifically, there is a higher rate of:  Fetal malformations  Preeclampsia  Gestational diabetes  Intrauterine fetal death        She ultimately does not desire invasive genetic testing and declined aneuploidy screening.         PSYCHIATRIC DISORDERS  DISORDERS IN PREGNANCY     General Principles  Although pregnancy has typically been considered a time of emotional well-being, recent studies suggest that up to 20% of women suffer from mood or anxiety disorders during pregnancy.  Particularly vulnerable are those women with histories of psychiatric illness who discontinue psychotropic medications during pregnancy. In a recent study which prospectively followed a group of women with histories of major depression across pregnancy, of the 82 women who maintained antidepressant treatment throughout pregnancy, 21 (26%) relapsed compared with 44 (68%) of the 65 women who discontinued medication. This study estimated that women who discontinued medication were 5 times as likely to relapse as compared to women who maintained treatment.     High rates of relapse have also been observed in women with bipolar disorder. One study indicated that during the course of pregnancy, 70.8% of the women experienced at least one mood episode. The risk of recurrence was significantly higher in women who discontinued treatment with mood stabilizers (85.5%) than those who maintained treatment (37.0%). Although data accumulated over the last 30 years suggest that some medications may be used safely during pregnancy, knowledge regarding the risks of prenatal exposure to psychotropic medications is incomplete. Thus, it is relatively common for patients to discontinue or to avoid pharmacologic treatment during pregnancy.     Decisions regarding the initiation or maintenance of treatment during pregnancy must reflect an understanding of the risks associated with fetal exposure to a particular medication but must also take into consideration the risks associated with untreated psychiatric illness in the mother. Psychiatric illness in the mother is not a benign event and may cause significant morbidity for both the mother and her child; thus, discontinuing or withholding medication during pregnancy is not always the safest option.  Depression and anxiety during pregnancy have been associated with a variety of adverse pregnancy outcomes. Women who suffer from psychiatric illness during pregnancy are less likely to receive  adequate prenatal care and are more likely to use alcohol, tobacco, and other substances known to adversely affect pregnancy outcomes. Several studies have described low birth weight and fetal growth retardation in children born to depressed mothers.  delivery is another potential pregnancy complication among women experiencing distress during pregnancy. Pregnancy complications related to maternal depression and anxiety in late pregnancy have also been described, including an increased risk for having pre-eclampsia, operative delivery, and infant admission to a special care nursery for a variety of conditions including respiratory distress, hypoglycemia, and prematurity. These data underscore the need to perform a thorough risk/benefit analysis of pregnant women with psychiatric illness, including evaluating the impact of untreated illness on the baby and the mother, as well as the risks of using medication during pregnancy.     Risk of Teratogenesis  The baseline incidence of major congenital malformations in newborns born in the [place] is estimated to be between 2 and 4%. During the earliest stages of pregnancy, formation of major organ systems takes place and is complete within the first 12 weeks after conception. Therefore, discussion around risks of exposures during pregnancy may be broken down by the timing of exposure or trimester, with particular vigilance around first trimester exposures.     A teratogen is defined as an agent that interferes the in utero development process and produces some type of organ malformation or dysfunction. For each organ or organ system, there exists a critical period during which development takes place and is susceptible to the effects of a teratogen. For example, neural tube folding and closure, forming the brain and spinal cord, occur within the first four weeks of gestation. Most of the formation of the heart and great vessels takes place from four to nine  weeks after conception, although the entire first trimester is often considered pertinent.     Risk of  Symptoms   toxicity or  syndromes (sometimes referred to as  “withdrawal”) refer to a spectrum of physical and behavioral symptoms observed in the acute  period that can be attributed to drug exposure at or near the time of delivery. Anecdotal reports that attribute these syndromes to drug exposure must be cautiously interpreted, and larger samples must be studied in order to establish a causal link between exposure to a particular medication and a  syndrome.     Risk of Long-Term Effects  Although the data suggest that some medications may be used safely during pregnancy if clinically warranted, our knowledge regarding the long-term effects of prenatal exposure to psychotropic medications is incomplete. Because neuronal migration and differentiation occur throughout pregnancy and into the early years of life, the central nervous system (CNS) remains particularly vulnerable to toxic agents throughout pregnancy. While exposures to teratogens early in pregnancy may result in clear abnormalities, exposures that occur after neural tube closure (at 32 days of gestation) may produce more subtle changes in behavior and functioning.     Vyvance: Lisdexamfetamine is converted to dextroamphetamine. The majority of human data is based on illicit amphetamine/methamphetamine exposure and not from therapeutic maternal use. Use of amphetamines during pregnancy may lead to an increased risk of premature birth and low birth weight; newborns may experience symptoms of withdrawal. Behavioral problems may also occur later in childhood.    Buspirone: Buspirone has not been adequately studied in pregnant women.  There are no adequate or well-controlled studies in pregnant women.  There is no evidence of fetal  harm in rat and rabbit studies in which the animals were on much higher doses  than the max recommended human dose.    Therefore based on her symptoms, I would support her taking this medication during pregnancy under the guidance of a psychiatrist.    We discussed the recommended plan of care based on her  risk factors.  Denisha and her significant other, Aj, had their questions answered to their satisfaction.      OB ULTRASOUND REPORT   See imaging tab for complete ultrasound report or in PACS    Single IUP in cephalic presentation.    Placenta is anterior.   Cardiac activity is present at 164 bpm  EFW 2741 g ( 6 lb 1 oz); 80%.    CHRISTOPHER is  11.8 cm.  MVP is 4.9 cm        BIOPHYSICAL PROFILE:  Movement:    2/2  Tone:            2/2  Breathin/2  Fluid:             2/2  TOTAL:             IMPRESSION:  IUP at 33w3d   Normal growth ultrasound  Advanced maternal age, aneuploidy and screening and diagnostic testing has been declined  Anxiety  ADHD      RECOMMENDATIONS:  Continue care with Dr. Paris  She should continue to follow with her behavioral health care team.  If medications are needed there are safer options to use in pregnancy  Weekly NST at 36 weeks    Total time spent 25 minutes this calendar day which includes preparing to see the patient including chart review, obtaining and/or reviewing additional medical history, performing a physical exam and evaluation, documenting clinical information in the electronic medical record, independently interpreting results, counseling the patient, communicating results to the patient/family/caregiver and coordinating care.     Case discussed with patient who demonstrated understanding and agreement with plan.     Thank you for allowing me to participate in the care of this patient.  Please feel free to contact me with any questions.    Rich Osorio D.O.  Maternal Fetal Medicine        Note to patient and family:  The 21st Century Cures Act makes medical notes available to patients in the interest of transparency.  However,  please be advised that this is a medical document.  It is intended as a peer to peer communication.  It is written in medical language and may contain abbreviations or verbiage that are technical and unfamiliar.  It may appear blunt or direct.  Medical documents are intended to carry relevant information, facts as evident, and the clinical opinion of the practitioner.         [1]   Allergies  Allergen Reactions    Avocado RASH and ITCHING    Dust UNKNOWN    Ragweed UNKNOWN

## 2025-05-01 ENCOUNTER — ROUTINE PRENATAL (OUTPATIENT)
Facility: CLINIC | Age: 40
End: 2025-05-01
Payer: COMMERCIAL

## 2025-05-01 VITALS
HEIGHT: 64 IN | WEIGHT: 172.63 LBS | DIASTOLIC BLOOD PRESSURE: 78 MMHG | HEART RATE: 85 BPM | SYSTOLIC BLOOD PRESSURE: 122 MMHG | BODY MASS INDEX: 29.47 KG/M2

## 2025-05-01 DIAGNOSIS — Z98.891 HISTORY OF CESAREAN SECTION: ICD-10-CM

## 2025-05-01 DIAGNOSIS — Z98.891 HISTORY OF VBAC: ICD-10-CM

## 2025-05-01 DIAGNOSIS — O09.523 AMA (ADVANCED MATERNAL AGE) MULTIGRAVIDA 35+, THIRD TRIMESTER (HCC): ICD-10-CM

## 2025-05-01 DIAGNOSIS — Z3A.34 34 WEEKS GESTATION OF PREGNANCY (HCC): Primary | ICD-10-CM

## 2025-05-01 PROCEDURE — 3008F BODY MASS INDEX DOCD: CPT

## 2025-05-01 PROCEDURE — 3078F DIAST BP <80 MM HG: CPT

## 2025-05-01 PROCEDURE — 3074F SYST BP LT 130 MM HG: CPT

## 2025-05-01 NOTE — PROGRESS NOTES
Gen 34w6d    She is having lower back pain and round ligament discomfort   Both of her feet are slightly swollen     EDC by LMP c/w 12wk US   -NIPS & Carrier - declines   -1hr elevated, 3hr gtt nl  - HIV, T pal and Tdap done      1) AMA  -NIPS - declines   -no other risk factors for preeclampsia, no recommendation for ASA  -L2US- normal   -growth US 33 wk & BPP: efw 80% BPP   -NSTs 36wk     2) ADHD, anxiety  -used to take vyvanse, buspirone - stopped with pregnancy  -pt doing ok, d/w pt that there are options that are OK during pregnancy if she feels worse  -decline herapy      3) BIRADS 3 on mammogram  - 2%risk of malignancy.  encouraged to make follow up mammogram per recommendations - ok to do in pregnancy, but can discuss with radiology   - R/B discussed    4)  x1   -would like to try to  again  -1st pregnancy C/S for breech presentation.     RTC 2 wk, NST at next visit

## 2025-05-07 ENCOUNTER — TELEPHONE (OUTPATIENT)
Facility: CLINIC | Age: 40
End: 2025-05-07

## 2025-05-07 NOTE — TELEPHONE ENCOUNTER
Breast Pump order was received from [employer] [facility] [facility] beds at the [facility].  Order form was placed in Dr. Alysa Sellers's bin for signature.

## 2025-05-08 ENCOUNTER — ROUTINE PRENATAL (OUTPATIENT)
Facility: CLINIC | Age: 40
End: 2025-05-08
Payer: COMMERCIAL

## 2025-05-08 VITALS
BODY MASS INDEX: 29.19 KG/M2 | WEIGHT: 171 LBS | HEIGHT: 64 IN | DIASTOLIC BLOOD PRESSURE: 80 MMHG | HEART RATE: 85 BPM | SYSTOLIC BLOOD PRESSURE: 120 MMHG

## 2025-05-08 DIAGNOSIS — O09.523 AMA (ADVANCED MATERNAL AGE) MULTIGRAVIDA 35+, THIRD TRIMESTER (HCC): Primary | ICD-10-CM

## 2025-05-08 PROCEDURE — 3008F BODY MASS INDEX DOCD: CPT

## 2025-05-08 PROCEDURE — 59025 FETAL NON-STRESS TEST: CPT

## 2025-05-08 PROCEDURE — 3079F DIAST BP 80-89 MM HG: CPT

## 2025-05-08 PROCEDURE — 3074F SYST BP LT 130 MM HG: CPT

## 2025-05-09 ENCOUNTER — PATIENT MESSAGE (OUTPATIENT)
Facility: CLINIC | Age: 40
End: 2025-05-09

## 2025-05-17 ENCOUNTER — ROUTINE PRENATAL (OUTPATIENT)
Facility: CLINIC | Age: 40
End: 2025-05-17
Payer: COMMERCIAL

## 2025-05-17 VITALS
HEART RATE: 83 BPM | DIASTOLIC BLOOD PRESSURE: 78 MMHG | BODY MASS INDEX: 29.71 KG/M2 | SYSTOLIC BLOOD PRESSURE: 134 MMHG | WEIGHT: 174 LBS | HEIGHT: 64 IN

## 2025-05-17 DIAGNOSIS — O09.523 AMA (ADVANCED MATERNAL AGE) MULTIGRAVIDA 35+, THIRD TRIMESTER (HCC): ICD-10-CM

## 2025-05-17 DIAGNOSIS — Z98.891 HISTORY OF CESAREAN SECTION: ICD-10-CM

## 2025-05-17 DIAGNOSIS — Z98.891 HISTORY OF VBAC: ICD-10-CM

## 2025-05-17 DIAGNOSIS — Z34.83 PRENATAL CARE, SUBSEQUENT PREGNANCY IN THIRD TRIMESTER (HCC): Primary | ICD-10-CM

## 2025-05-17 PROCEDURE — 3075F SYST BP GE 130 - 139MM HG: CPT | Performed by: STUDENT IN AN ORGANIZED HEALTH CARE EDUCATION/TRAINING PROGRAM

## 2025-05-17 PROCEDURE — 87150 DNA/RNA AMPLIFIED PROBE: CPT | Performed by: STUDENT IN AN ORGANIZED HEALTH CARE EDUCATION/TRAINING PROGRAM

## 2025-05-17 PROCEDURE — 3008F BODY MASS INDEX DOCD: CPT | Performed by: STUDENT IN AN ORGANIZED HEALTH CARE EDUCATION/TRAINING PROGRAM

## 2025-05-17 PROCEDURE — 87081 CULTURE SCREEN ONLY: CPT | Performed by: STUDENT IN AN ORGANIZED HEALTH CARE EDUCATION/TRAINING PROGRAM

## 2025-05-17 PROCEDURE — 3078F DIAST BP <80 MM HG: CPT | Performed by: STUDENT IN AN ORGANIZED HEALTH CARE EDUCATION/TRAINING PROGRAM

## 2025-05-17 PROCEDURE — 59025 FETAL NON-STRESS TEST: CPT | Performed by: STUDENT IN AN ORGANIZED HEALTH CARE EDUCATION/TRAINING PROGRAM

## 2025-05-17 NOTE — PROGRESS NOTES
Gen 37.1     She is doing well, no complaints   Declines IOL for now     Nst : reactive   SVE: /-3 soft posterior     EDC by LMP c/w 12wk US   -NIPS & Carrier - declines   -1hr elevated, 3hr gtt nl  - HIV, T pal and Tdap done   - GBS today      1) AMA  -NIPS - declines   -no other risk factors for preeclampsia, no recommendation for ASA  -L2US- normal   -growth US 33 wk & BPP: efw 80% BPP   -NSTs 36wk     2) ADHD, anxiety  -used to take vyvanse, buspirone - stopped with pregnancy  -pt doing ok, d/w pt that there are options that are OK during pregnancy if she feels worse  -decline herapy      3) BIRADS 3 on mammogram  - 2%risk of malignancy.  encouraged to make follow up mammogram per recommendations - ok to do in pregnancy, but can discuss with radiology   - R/B discussed     4)  x1   -would like to try to  again  -1st pregnancy C/S for breech presentation.      RTC 1 wk for NST & Gbs

## 2025-05-18 LAB — GROUP B STREP BY PCR FOR PCR OVT: NEGATIVE

## 2025-05-19 ENCOUNTER — MED REC SCAN ONLY (OUTPATIENT)
Facility: CLINIC | Age: 40
End: 2025-05-19

## 2025-05-24 ENCOUNTER — ROUTINE PRENATAL (OUTPATIENT)
Facility: CLINIC | Age: 40
End: 2025-05-24
Payer: COMMERCIAL

## 2025-05-24 VITALS
WEIGHT: 175 LBS | HEIGHT: 64 IN | SYSTOLIC BLOOD PRESSURE: 138 MMHG | HEART RATE: 80 BPM | BODY MASS INDEX: 29.88 KG/M2 | DIASTOLIC BLOOD PRESSURE: 82 MMHG

## 2025-05-24 DIAGNOSIS — O09.523 MULTIGRAVIDA OF ADVANCED MATERNAL AGE IN THIRD TRIMESTER (HCC): ICD-10-CM

## 2025-05-24 DIAGNOSIS — Z34.83 PRENATAL CARE, SUBSEQUENT PREGNANCY IN THIRD TRIMESTER (HCC): Primary | ICD-10-CM

## 2025-05-24 DIAGNOSIS — Z3A.38 38 WEEKS GESTATION OF PREGNANCY (HCC): ICD-10-CM

## 2025-05-24 NOTE — PROGRESS NOTES
38w1d  Patient has no complaints  - labor instructions    Declines IOL for now     Nst : reactive   SVE: /-2 declined membranes sweep    EDC by LMP c/w 12wk US   -NIPS & Carrier - declines   -1hr elevated, 3hr gtt nl  - HIV, T pal and Tdap done        1) AMA  -NIPS - declines   -no other risk factors for preeclampsia, no recommendation for ASA  -L2US- normal   -growth US 33 wk & BPP: efw 80% BPP   -NSTs 36wk     2) ADHD, anxiety  -used to take vyvanse, buspirone - stopped with pregnancy  -pt doing ok, d/w pt that there are options that are OK during pregnancy if she feels worse  -decline herapy      3) BIRADS 3 on mammogram  - 2%risk of malignancy.  encouraged to make follow up mammogram per recommendations - ok to do in pregnancy, but can discuss with radiology   - R/B discussed     4)  x1   -would like to try to  again  -1st pregnancy C/S for breech presentation.

## 2025-05-31 ENCOUNTER — ROUTINE PRENATAL (OUTPATIENT)
Facility: CLINIC | Age: 40
End: 2025-05-31
Payer: COMMERCIAL

## 2025-05-31 VITALS
SYSTOLIC BLOOD PRESSURE: 122 MMHG | HEART RATE: 88 BPM | HEIGHT: 64 IN | WEIGHT: 179.81 LBS | DIASTOLIC BLOOD PRESSURE: 76 MMHG | BODY MASS INDEX: 30.7 KG/M2

## 2025-05-31 DIAGNOSIS — O09.523 AMA (ADVANCED MATERNAL AGE) MULTIGRAVIDA 35+, THIRD TRIMESTER (HCC): Primary | ICD-10-CM

## 2025-05-31 DIAGNOSIS — Z98.891 HISTORY OF CESAREAN SECTION: ICD-10-CM

## 2025-05-31 DIAGNOSIS — Z98.891 HISTORY OF VBAC: ICD-10-CM

## 2025-05-31 PROCEDURE — 3074F SYST BP LT 130 MM HG: CPT

## 2025-05-31 PROCEDURE — 3008F BODY MASS INDEX DOCD: CPT

## 2025-05-31 PROCEDURE — 59025 FETAL NON-STRESS TEST: CPT

## 2025-05-31 PROCEDURE — 3078F DIAST BP <80 MM HG: CPT

## 2025-05-31 NOTE — PROGRESS NOTES
Gen 39w1d    She is doing well, no complaints  Declines IOL, wants to discuss at next visit   Sve 1-2/-2 cephalic, offered to strip membranes, pt declined.     NST reactive     EDC by LMP c/w 12wk US   -NIPS & Carrier - declines   -1hr elevated, 3hr gtt nl  - HIV, T pal and Tdap done         1) AMA  -NIPS - declines   -no other risk factors for preeclampsia, no recommendation for ASA  -L2US- normal   -growth US 33 wk & BPP: efw 80% BPP   -NSTs 36wk     2) ADHD, anxiety  -used to take vyvanse, buspirone - stopped with pregnancy  -pt doing ok, d/w pt that there are options that are OK during pregnancy if she feels worse  -decline herapy      3) BIRADS 3 on mammogram  - 2%risk of malignancy.  encouraged to make follow up mammogram per recommendations - ok to do in pregnancy, but can discuss with radiology   - R/B discussed     4)  x1   -would like to try to  again  -1st pregnancy C/S for breech presentation.

## 2025-06-01 ENCOUNTER — HOSPITAL ENCOUNTER (INPATIENT)
Facility: HOSPITAL | Age: 40
LOS: 1 days | Discharge: HOME | End: 2025-06-02
Attending: STUDENT IN AN ORGANIZED HEALTH CARE EDUCATION/TRAINING PROGRAM | Admitting: STUDENT IN AN ORGANIZED HEALTH CARE EDUCATION/TRAINING PROGRAM
Payer: COMMERCIAL

## 2025-06-01 PROBLEM — O34.219 VAGINAL BIRTH AFTER CESAREAN DELIVERY (HCC): Status: ACTIVE | Noted: 2025-06-01

## 2025-06-01 PROBLEM — Z34.90 PREGNANCY (HCC): Status: ACTIVE | Noted: 2025-06-01

## 2025-06-01 LAB
ALBUMIN SERPL-MCNC: 4.3 G/DL (ref 3.2–4.8)
ALBUMIN/GLOB SERPL: 1.5 {RATIO} (ref 1–2)
ALP LIVER SERPL-CCNC: 220 U/L (ref 37–98)
ALT SERPL-CCNC: 18 U/L (ref 10–49)
ANION GAP SERPL CALC-SCNC: 12 MMOL/L (ref 0–18)
ANTIBODY SCREEN: NEGATIVE
AST SERPL-CCNC: 26 U/L (ref ?–34)
BASOPHILS # BLD AUTO: 0.09 X10(3) UL (ref 0–0.2)
BASOPHILS NFR BLD AUTO: 0.5 %
BILIRUB SERPL-MCNC: 0.4 MG/DL (ref 0.3–1.2)
BUN BLD-MCNC: 10 MG/DL (ref 9–23)
CALCIUM BLD-MCNC: 10 MG/DL (ref 8.7–10.6)
CHLORIDE SERPL-SCNC: 103 MMOL/L (ref 98–112)
CO2 SERPL-SCNC: 22 MMOL/L (ref 21–32)
CREAT BLD-MCNC: 0.7 MG/DL (ref 0.55–1.02)
EGFRCR SERPLBLD CKD-EPI 2021: 113 ML/MIN/1.73M2 (ref 60–?)
EOSINOPHIL # BLD AUTO: 0.12 X10(3) UL (ref 0–0.7)
EOSINOPHIL NFR BLD AUTO: 0.7 %
ERYTHROCYTE [DISTWIDTH] IN BLOOD BY AUTOMATED COUNT: 13.2 %
FASTING STATUS PATIENT QL REPORTED: NO
GLOBULIN PLAS-MCNC: 2.8 G/DL (ref 2–3.5)
GLUCOSE BLD-MCNC: 81 MG/DL (ref 70–99)
HCT VFR BLD AUTO: 40.4 % (ref 35–48)
HGB BLD-MCNC: 14.2 G/DL (ref 12–16)
IMM GRANULOCYTES # BLD AUTO: 0.24 X10(3) UL (ref 0–1)
IMM GRANULOCYTES NFR BLD: 1.3 %
LYMPHOCYTES # BLD AUTO: 2.28 X10(3) UL (ref 1–4)
LYMPHOCYTES NFR BLD AUTO: 12.7 %
MCH RBC QN AUTO: 32.8 PG (ref 26–34)
MCHC RBC AUTO-ENTMCNC: 35.1 G/DL (ref 31–37)
MCV RBC AUTO: 93.3 FL (ref 80–100)
MONOCYTES # BLD AUTO: 0.85 X10(3) UL (ref 0.1–1)
MONOCYTES NFR BLD AUTO: 4.7 %
NEUTROPHILS # BLD AUTO: 14.42 X10 (3) UL (ref 1.5–7.7)
NEUTROPHILS # BLD AUTO: 14.42 X10(3) UL (ref 1.5–7.7)
NEUTROPHILS NFR BLD AUTO: 80.1 %
OSMOLALITY SERPL CALC.SUM OF ELEC: 282 MOSM/KG (ref 275–295)
PLATELET # BLD AUTO: 230 10(3)UL (ref 150–450)
POTASSIUM SERPL-SCNC: 4 MMOL/L (ref 3.5–5.1)
PROT SERPL-MCNC: 7.1 G/DL (ref 5.7–8.2)
RBC # BLD AUTO: 4.33 X10(6)UL (ref 3.8–5.3)
RH BLOOD TYPE: POSITIVE
SODIUM SERPL-SCNC: 137 MMOL/L (ref 136–145)
T PALLIDUM AB SER QL IA: NONREACTIVE
URATE SERPL-MCNC: 5.4 MG/DL (ref 3.1–7.8)
WBC # BLD AUTO: 18 X10(3) UL (ref 4–11)

## 2025-06-01 PROCEDURE — 0KQM0ZZ REPAIR PERINEUM MUSCLE, OPEN APPROACH: ICD-10-PCS | Performed by: STUDENT IN AN ORGANIZED HEALTH CARE EDUCATION/TRAINING PROGRAM

## 2025-06-01 RX ORDER — ACETAMINOPHEN 500 MG
1000 TABLET ORAL EVERY 6 HOURS PRN
Status: DISCONTINUED | OUTPATIENT
Start: 2025-06-01 | End: 2025-06-02

## 2025-06-01 RX ORDER — ONDANSETRON 2 MG/ML
4 INJECTION INTRAMUSCULAR; INTRAVENOUS EVERY 6 HOURS PRN
Status: CANCELLED | OUTPATIENT
Start: 2025-06-01

## 2025-06-01 RX ORDER — IBUPROFEN 600 MG/1
600 TABLET, FILM COATED ORAL EVERY 6 HOURS
Status: DISCONTINUED | OUTPATIENT
Start: 2025-06-01 | End: 2025-06-02

## 2025-06-01 RX ORDER — AMMONIA 15 % (W/V)
0.3 AMPUL (EA) INHALATION AS NEEDED
Status: DISCONTINUED | OUTPATIENT
Start: 2025-06-01 | End: 2025-06-02

## 2025-06-01 RX ORDER — DEXTROSE, SODIUM CHLORIDE, SODIUM LACTATE, POTASSIUM CHLORIDE, AND CALCIUM CHLORIDE 5; .6; .31; .03; .02 G/100ML; G/100ML; G/100ML; G/100ML; G/100ML
INJECTION, SOLUTION INTRAVENOUS AS NEEDED
Status: CANCELLED | OUTPATIENT
Start: 2025-06-01

## 2025-06-01 RX ORDER — ONDANSETRON 2 MG/ML
4 INJECTION INTRAMUSCULAR; INTRAVENOUS EVERY 6 HOURS PRN
Status: DISCONTINUED | OUTPATIENT
Start: 2025-06-01 | End: 2025-06-01 | Stop reason: HOSPADM

## 2025-06-01 RX ORDER — LIDOCAINE HYDROCHLORIDE 20 MG/ML
5 INJECTION, SOLUTION EPIDURAL; INFILTRATION; INTRACAUDAL; PERINEURAL AS NEEDED
Status: DISCONTINUED | OUTPATIENT
Start: 2025-06-01 | End: 2025-06-01

## 2025-06-01 RX ORDER — ROPIVACAINE HYDROCHLORIDE 5 MG/ML
30 INJECTION, SOLUTION EPIDURAL; INFILTRATION; PERINEURAL AS NEEDED
Status: DISCONTINUED | OUTPATIENT
Start: 2025-06-01 | End: 2025-06-01 | Stop reason: HOSPADM

## 2025-06-01 RX ORDER — MISOPROSTOL 200 UG/1
TABLET ORAL
Status: COMPLETED
Start: 2025-06-01 | End: 2025-06-01

## 2025-06-01 RX ORDER — ACETAMINOPHEN 500 MG
500 TABLET ORAL EVERY 6 HOURS PRN
Status: DISCONTINUED | OUTPATIENT
Start: 2025-06-01 | End: 2025-06-02

## 2025-06-01 RX ORDER — NALBUPHINE HYDROCHLORIDE 10 MG/ML
2.5 INJECTION INTRAMUSCULAR; INTRAVENOUS; SUBCUTANEOUS
Status: DISCONTINUED | OUTPATIENT
Start: 2025-06-01 | End: 2025-06-01

## 2025-06-01 RX ORDER — MISOPROSTOL 200 UG/1
1000 TABLET ORAL ONCE
Status: COMPLETED | OUTPATIENT
Start: 2025-06-01 | End: 2025-06-01

## 2025-06-01 RX ORDER — DOCUSATE SODIUM 100 MG/1
100 CAPSULE, LIQUID FILLED ORAL
Status: DISCONTINUED | OUTPATIENT
Start: 2025-06-01 | End: 2025-06-02

## 2025-06-01 RX ORDER — CALCIUM CARBONATE 500 MG/1
1000 TABLET, CHEWABLE ORAL EVERY 4 HOURS PRN
Status: DISCONTINUED | OUTPATIENT
Start: 2025-06-01 | End: 2025-06-01 | Stop reason: HOSPADM

## 2025-06-01 RX ORDER — ACETAMINOPHEN 500 MG
500 TABLET ORAL EVERY 6 HOURS PRN
Status: CANCELLED | OUTPATIENT
Start: 2025-06-01

## 2025-06-01 RX ORDER — LABETALOL HYDROCHLORIDE 5 MG/ML
20 INJECTION, SOLUTION INTRAVENOUS ONCE
Status: COMPLETED | OUTPATIENT
Start: 2025-06-01 | End: 2025-06-01

## 2025-06-01 RX ORDER — LIDOCAINE HYDROCHLORIDE AND EPINEPHRINE 15; 5 MG/ML; UG/ML
5 INJECTION, SOLUTION EPIDURAL AS NEEDED
Status: DISCONTINUED | OUTPATIENT
Start: 2025-06-01 | End: 2025-06-01

## 2025-06-01 RX ORDER — BUPIVACAINE HCL/0.9 % NACL/PF 0.25 %
5 PLASTIC BAG, INJECTION (ML) EPIDURAL AS NEEDED
Status: DISCONTINUED | OUTPATIENT
Start: 2025-06-01 | End: 2025-06-01

## 2025-06-01 RX ORDER — CITRIC ACID/SODIUM CITRATE 334-500MG
30 SOLUTION, ORAL ORAL AS NEEDED
Status: CANCELLED | OUTPATIENT
Start: 2025-06-01

## 2025-06-01 RX ORDER — ENOXAPARIN SODIUM 100 MG/ML
40 INJECTION SUBCUTANEOUS DAILY
Status: DISCONTINUED | OUTPATIENT
Start: 2025-06-01 | End: 2025-06-01

## 2025-06-01 RX ORDER — ENOXAPARIN SODIUM 100 MG/ML
40 INJECTION SUBCUTANEOUS DAILY
Status: CANCELLED | OUTPATIENT
Start: 2025-06-01

## 2025-06-01 RX ORDER — BISACODYL 10 MG
10 SUPPOSITORY, RECTAL RECTAL ONCE AS NEEDED
Status: DISCONTINUED | OUTPATIENT
Start: 2025-06-01 | End: 2025-06-02

## 2025-06-01 RX ORDER — BUPIVACAINE HYDROCHLORIDE 2.5 MG/ML
30 INJECTION, SOLUTION EPIDURAL; INFILTRATION; INTRACAUDAL; PERINEURAL AS NEEDED
Status: DISCONTINUED | OUTPATIENT
Start: 2025-06-01 | End: 2025-06-01

## 2025-06-01 RX ORDER — SODIUM CHLORIDE, SODIUM LACTATE, POTASSIUM CHLORIDE, CALCIUM CHLORIDE 600; 310; 30; 20 MG/100ML; MG/100ML; MG/100ML; MG/100ML
INJECTION, SOLUTION INTRAVENOUS CONTINUOUS
Status: CANCELLED | OUTPATIENT
Start: 2025-06-01

## 2025-06-01 RX ORDER — SODIUM CHLORIDE 9 MG/ML
10 INJECTION, SOLUTION INTRAMUSCULAR; INTRAVENOUS; SUBCUTANEOUS AS NEEDED
Status: DISCONTINUED | OUTPATIENT
Start: 2025-06-01 | End: 2025-06-01

## 2025-06-01 RX ORDER — TERBUTALINE SULFATE 1 MG/ML
0.25 INJECTION SUBCUTANEOUS AS NEEDED
Status: CANCELLED | OUTPATIENT
Start: 2025-06-01

## 2025-06-01 RX ORDER — DEXTROSE, SODIUM CHLORIDE, SODIUM LACTATE, POTASSIUM CHLORIDE, AND CALCIUM CHLORIDE 5; .6; .31; .03; .02 G/100ML; G/100ML; G/100ML; G/100ML; G/100ML
INJECTION, SOLUTION INTRAVENOUS AS NEEDED
Status: DISCONTINUED | OUTPATIENT
Start: 2025-06-01 | End: 2025-06-01 | Stop reason: HOSPADM

## 2025-06-01 RX ORDER — IBUPROFEN 600 MG/1
600 TABLET, FILM COATED ORAL ONCE AS NEEDED
Status: DISCONTINUED | OUTPATIENT
Start: 2025-06-01 | End: 2025-06-01 | Stop reason: HOSPADM

## 2025-06-01 RX ORDER — CHOLECALCIFEROL (VITAMIN D3) 25 MCG
1 TABLET,CHEWABLE ORAL DAILY
Status: DISCONTINUED | OUTPATIENT
Start: 2025-06-02 | End: 2025-06-02

## 2025-06-01 RX ORDER — NIFEDIPINE 30 MG/1
30 TABLET, EXTENDED RELEASE ORAL DAILY
Status: DISCONTINUED | OUTPATIENT
Start: 2025-06-01 | End: 2025-06-02

## 2025-06-01 RX ORDER — ACETAMINOPHEN 500 MG
500 TABLET ORAL EVERY 6 HOURS PRN
Status: DISCONTINUED | OUTPATIENT
Start: 2025-06-01 | End: 2025-06-01 | Stop reason: HOSPADM

## 2025-06-01 RX ORDER — CETIRIZINE HYDROCHLORIDE 10 MG/1
10 TABLET ORAL DAILY
Status: DISCONTINUED | OUTPATIENT
Start: 2025-06-02 | End: 2025-06-02

## 2025-06-01 RX ORDER — SIMETHICONE 80 MG
80 TABLET,CHEWABLE ORAL 3 TIMES DAILY PRN
Status: DISCONTINUED | OUTPATIENT
Start: 2025-06-01 | End: 2025-06-02

## 2025-06-01 RX ORDER — ROPIVACAINE HYDROCHLORIDE 5 MG/ML
30 INJECTION, SOLUTION EPIDURAL; INFILTRATION; PERINEURAL AS NEEDED
Status: CANCELLED | OUTPATIENT
Start: 2025-06-01

## 2025-06-01 RX ORDER — CITRIC ACID/SODIUM CITRATE 334-500MG
30 SOLUTION, ORAL ORAL AS NEEDED
Status: DISCONTINUED | OUTPATIENT
Start: 2025-06-01 | End: 2025-06-01 | Stop reason: HOSPADM

## 2025-06-01 RX ORDER — TERBUTALINE SULFATE 1 MG/ML
0.25 INJECTION SUBCUTANEOUS AS NEEDED
Status: DISCONTINUED | OUTPATIENT
Start: 2025-06-01 | End: 2025-06-01 | Stop reason: HOSPADM

## 2025-06-01 RX ORDER — IBUPROFEN 600 MG/1
600 TABLET, FILM COATED ORAL ONCE AS NEEDED
Status: CANCELLED | OUTPATIENT
Start: 2025-06-01

## 2025-06-01 RX ORDER — SODIUM CHLORIDE, SODIUM LACTATE, POTASSIUM CHLORIDE, CALCIUM CHLORIDE 600; 310; 30; 20 MG/100ML; MG/100ML; MG/100ML; MG/100ML
INJECTION, SOLUTION INTRAVENOUS CONTINUOUS
Status: DISCONTINUED | OUTPATIENT
Start: 2025-06-01 | End: 2025-06-01 | Stop reason: HOSPADM

## 2025-06-01 RX ORDER — ACETAMINOPHEN 500 MG
1000 TABLET ORAL EVERY 6 HOURS PRN
Status: DISCONTINUED | OUTPATIENT
Start: 2025-06-01 | End: 2025-06-01 | Stop reason: HOSPADM

## 2025-06-01 RX ORDER — ACETAMINOPHEN 500 MG
1000 TABLET ORAL EVERY 6 HOURS PRN
Status: CANCELLED | OUTPATIENT
Start: 2025-06-01

## 2025-06-01 NOTE — H&P
[facility]  Obstetrics and Gynecology  History & Physical    Denisha Roberts Patient Status:  Inpatient    1985 MRN [id number]   Location [facility] LABOR & DELIVERY Attending Gavi Miranda MD   Hospital Day 0 PCP Ivette Brennan DO     CC: Patient is here for management of TOLAC    SUBJECTIVE:    Denisha Roberts is a 40 year old  female at 39w2d Estimated Date of Delivery: 25 who is being admitted for management of TOLAC. Patient was seen in clinic 1 day PTA and was 1-2. Patient then states contractions started around 0500 this morning and  have been regular. SVE on admission 4 cm and then 2 hours later /-2. Patient admitted for expectant management of labor.     Her current obstetrical history is significant for:  History of CS x 1  History of successful   AMA    Patient reports doing well. Walking around the room. Is not going to want an epidural - did not have one with her last pregnancy. Declines AROM, would like expectant management.     LAVELL Confirmation  LMP: Patient's last menstrual period was 2024.  LAVELL: 2025, by Last Menstrual Period       Obstetric History:   OB History    Para Term  AB Living   3 2 2 0 0 2   SAB IAB Ectopic Multiple Live Births   0 0   2      # Outcome Date GA Lbr José Luis/2nd Weight Sex Type Anes PTL Lv   3 Current            2 Term 16 40w0d  8 lb 8 oz (3.856 kg) M NORMAL SPONT   JANETT   1 Term 13 37w0d  8 lb 2 oz (3.685 kg) F Caesarean   JANETT     Past Medical History: Past Medical History[1]  Past Social History: Past Surgical History[2]  Family History: Family History[3]  Social History:   Social History     Tobacco Use    Smoking status: Former     Types: Cigarettes     Start date: 2024     Quit date: 202     Years since quitting: 3.4    Smokeless tobacco: Never   Substance Use Topics    Alcohol use: Not Currently     Comment: special occasions       Home Meds: Prescriptions Prior to  Admission[4]  Allergies: Allergies[5]    OBJECTIVE:    Temp:  [98.3 °F (36.8 °C)] 98.3 °F (36.8 °C)  Pulse:  [81-84] 81  Resp:  [16] 16  BP: (134-149)/(76-87) 134/76  Body mass index is 30.73 kg/m².    General: AAO. NAD.   Lungs: no tachypnea, retractions or cyanosis  CV: normal peripheral perfusion  Abdomen: FHT present, gravid     FHT: moderate variability/150 BPM / Positive accelerations/Negative decelerations   TOCO: q 2-3 minutes    SVE: 6 / 90 / -2 per last RN exam at 0915     Prenatal Labs Brief Review   Blood Type:   Lab Results   Component Value Date    ABO A 2025    RH Positive 2025     GBS:  Negative      Inpatient labs:  Lab Results   Component Value Date    WBC 18.0 2025    HGB 14.2 2025    HCT 40.4 2025    .0 2025    CREATSERUM 0.70 2025    BUN 10 2025     2025    K 4.0 2025     2025    CO2 22.0 2025    GLU 81 2025    CA 10.0 2025    ALB 4.3 2025    ALKPHO 220 2025    BILT 0.4 2025    TP 7.1 2025    AST 26 2025    ALT 18 2025             ASSESSMENT/ PLAN:    Denisha Roberts is a 40 year old  female at 39w2d Estimated Date of Delivery: 25 who is being admitted for TOLAC management.     Problem List[6]    1. Labor:    - TOLAC candidate   - History of CS x 1 with breech, successful  following   - aware of R/B/A of RCS vs TOLAC including risk of uterine rupture which does not go down with previous vaginal delivery, patient aware and would like TOLAC   2. Fetal monitoring: CEFM  3. GBS: negative  4. Pain: declines epidural     Risks, benefits, alternatives and possible complications have been discussed in detail with the patient.  Pre-admission, admission, and post admission procedures and expectations were discussed in detail.  All questions answered, all appropriate consents will be signed at the Hospital. Admission is planned for today. DARSHAN  anticipated.    Gavi Miranda MD    [facility] - OBGYN      Note to patient and family   The  Century Cures Act makes medical notes available to patients in the interest of transparency.  However, please be advised that this is a medical document.  It is intended as vzir-bf-suir communication.  It is written and medical language may contain abbreviations or verbiage that are technical and unfamiliar.  It may appear blunt or direct.  Medical documents are intended to carry relevant information, facts as evident, and the clinical opinion of the practitioner.                 [1]   Past Medical History:   Allergic rhinitis    Anxiety    COVID-19 virus infection    Depression    Depressive disorder in remission    PMDD (premenstrual dysphoric disorder)    Vasovagal episode    Vasovagal syncope    at [facility]   [2]   Past Surgical History:  Procedure Laterality Date      2013    B[place]va [place]          Screening pap smear by phys     [3]   Family History  Problem Relation Age of Onset    High Cholesterol Mother     Hypertension Father     Heart Attack Father         lates 40's - stent    Heart Surgery Father     No Known Problems Sister     No Known Problems Sister     No Known Problems Sister     No Known Problems Daughter     No Known Problems Son     Cancer Maternal Grandmother         Lung    Colon Cancer Maternal Grandfather     Diabetes Maternal Grandfather     Cancer Paternal Grandfather         Lung    Breast Cancer Maternal Aunt 50        50s    Breast Cancer Paternal Aunt 50        50s   [4]   Medications Prior to Admission   Medication Sig Dispense Refill Last Dose/Taking    Loratadine (CLARITIN OR)    2025 Morning    ferrous sulfate 325 (65 FE) MG Oral Tab EC Take 1 tablet (325 mg total) by mouth every other day.   2025 Morning    prenatal vitamin with DHA 27-0.8-228 MG Oral Cap Take 1 capsule by mouth in the morning. -Gummy.   2025 Morning    busPIRone 5 MG  Oral Tab Take 1 tablet (5 mg total) by mouth daily with breakfast. (Patient not taking: Reported on 2025) 30 tablet 1    [5]   Allergies  Allergen Reactions    Avocado RASH and ITCHING    Dust UNKNOWN    Ragweed UNKNOWN   [6]   Patient Active Problem List  Diagnosis    Diastasis recti    PMDD (premenstrual dysphoric disorder)    ARI (generalized anxiety disorder)    Environmental allergies    Attention deficit hyperactivity disorder (ADHD), predominantly inattentive type    FH: breast cancer    Extremely dense tissue of both breasts on mammography    Medication management    AMA (advanced maternal age) multigravida 35+, third trimester (HCC)    History of  section    Pregnancy (HCC)

## 2025-06-01 NOTE — PROGRESS NOTES
Patient admitted to Mother-baby unit at this time.  Patient in stable condition.  Safety measures in place; will continue to montior.

## 2025-06-01 NOTE — PLAN OF CARE
Problem: BIRTH - VAGINAL/ SECTION  Goal: Fetal and maternal status remain reassuring during the birth process  Description: INTERVENTIONS:  - Monitor vital signs  - Monitor fetal heart rate  - Monitor uterine activity  - Monitor labor progression (vaginal delivery)  - DVT prophylaxis (C/S delivery)  - Surgical antibiotic prophylaxis (C/S delivery)  Outcome: Progressing     Problem: PAIN - ADULT  Goal: Verbalizes/displays adequate comfort level or patient's stated pain goal  Description: INTERVENTIONS:  - Encourage pt to monitor pain and request assistance  - Assess pain using appropriate pain scale  - Administer analgesics based on type and severity of pain and evaluate response  - Implement non-pharmacological measures as appropriate and evaluate response  - Consider cultural and social influences on pain and pain management  - Manage/alleviate anxiety  - Utilize distraction and/or relaxation techniques  - Monitor for opioid side effects  - Notify MD/LIP if interventions unsuccessful or patient reports new pain  - Anticipate increased pain with activity and pre-medicate as appropriate  Outcome: Progressing     Problem: ANXIETY  Goal: Will report anxiety at manageable levels  Description: INTERVENTIONS:  - Administer medication as ordered  - Teach and rehearse alternative coping skills  - Provide emotional support with 1:1 interaction with staff  Outcome: Progressing     Problem: Patient/Family Goals  Goal: Patient/Family Long Term Goal  Description: Patient's Long Term Goal: uncomplicated vaginal delivery    Interventions:  - See additional Care Plan goals for specific interventions  Outcome: Progressing  Goal: Patient/Family Short Term Goal  Description: Patient's Short Term Goal: effective pain and labor management    Interventions:   - See additional Care Plan goals for specific interventions  Outcome: Progressing

## 2025-06-01 NOTE — PROGRESS NOTES
Able to void prior to transfer but missed the hat. Dr. KIRBY aware of BP, 1 dose IV labetalol given and procardia started. Report given to Leann.

## 2025-06-01 NOTE — PROGRESS NOTES
admitted to triage 1 with  present. Pt ambulated to room, to bathroom, gown on, pt to bed. Pt states ctxs began around 0500, denies lof, has some spotting. Efm and toco applied. Initial assessment done.

## 2025-06-01 NOTE — DISCHARGE INSTRUCTIONS
Hypertension related to pregnancy/postpartum    -Watch for symptoms of pre-eclampsia (severe or persistent headache not relieved with a dose of tylenol, visual disturbances, persistent or severe upper abdominal pain, shortness of breath, chest pain)  -Please obtain a blood pressure cuff for home from the list of [place] Blood Pressure Validated Device Listing- see www.validatebp.org  -Check blood pressure (and heart rate if you can) 2-3 times per day & more frequently if feeling poorly. Keep log & bring to visits.      If BP is 140/90 or higher (either number) you will likely be prescribed oral antihypertensive medication. Check blood pressure before a dose of medication. Can hold the medicine if you feel your blood pressure is getting too low (less than 110/70) or you are lightheaded.      If BP is 160/110 (either number) or higher, or you develop symptoms of pre-eclampsia, please immediately go to the emergency department at [facility] & let them know you may have pre-eclampsia. You will likely require IV antihypertensives and IV magnesium sulfate to prevent stroke and seizures.      Postpartum care: What to expect after a vaginal birth  When caring for a , you might forget to care for yourself. But that's important too. Learn what's involved as you recover from giving birth.  Pregnancy changes a body in more ways than you might expect. And that doesn't stop when you give birth. Here's what can happen physically and emotionally after a vaginal delivery.  Vaginal soreness  You might have had a tear in your vagina during delivery. Or your healthcare professional may have made a cut in the vaginal opening, called an episiotomy, to make delivery easier. The wound may hurt for a few weeks. Large tears can take longer to heal. To ease the pain:  Sit on a pillow or padded ring.  Cool the area with an ice pack. Or put a chilled witch hazel pad between a sanitary napkin and the area between your vaginal opening and  anus. That area is called the perineum.  Use a squirt bottle to spray warm water over the perineum as you urinate.  Sit in a warm bath just deep enough to cover your buttocks and hips for five minutes. Use cold water if it feels better.  Take a pain reliever that you can buy without a prescription. Ask your healthcare professional about a numbing spray or cream, if needed.  .Avoid straining due to constipation through adequate hydration and a diet that incorporates whole foods that are plant-based.  If this is not enough to keep your stools a toothpaste like consistency, please add over-the-counter fiber supplementation like Metamucil or a daily osmotic laxative like Miralax.  You can take one capful of Miralax with water or juice each morning and, as needed, in the evening.  While having a bowel movement, you can use can also use a stool under your feet or a squatty potty to help prevent straining.  Tell your healthcare professional if you have intense pain, lasting pain or if the pain gets worse. It could be a sign of an infection.    Vaginal discharge  After delivery, a mix of blood, mucus and tissue from the uterus comes out of the vagina. This is called discharge. The discharge changes color and lessens over 4 to 6 weeks after a baby is born. It starts bright red, then turns darker red. After that, it usually turns yellow or white. The discharge then slows and becomes watery until it stops.  Contact your healthcare professional if blood from your vagina soaks a pad hourly for two hours in a row, especially if you also have a fever, pelvic pain or tenderness.  Contractions  You might feel contractions, sometimes called afterpains, for a few days after delivery. These contractions often feel like menstrual cramps. They help keep you from bleeding too much because they put pressure on the blood vessels in the uterus. Afterpains are common during breastfeeding. That's because breastfeeding causes the release of the  hormone oxytocin.  To ease the pain, you can use acetaminophen (Tylenol, others) or ibuprofen (Advil, Motrin IB, others).  Leaking urine  Pregnancy, labor and a vaginal delivery can stretch or hurt your pelvic floor muscles. These muscles support the uterus, bladder and rectum. As a result, some urine might leak when you sneeze, laugh or cough. The leaking usually gets better within a week. But it might go on longer. Leaking urine also is called incontinence.  Until the leaking stops, wear sanitary pads. Do pelvic floor muscle training, also called Kegels, to tone your pelvic floor muscles and help control your bladder.  To do Kegels, think of sitting on a marble. Tighten your pelvic muscles as if you're lifting the marble. Try it for three seconds at a time, then relax for a count of three. Work up to doing the exercise 10 to 15 times in a row, at least three times a day. To make sure you're doing Kegels right, it might help to see a physical therapist who specializes in pelvic floor exercises.  Hemorrhoids and bowel movements  If you notice pain during bowel movements and feel swelling near your anus, you might have swollen veins in the anus or lower rectum, called hemorrhoids. To ease hemorrhoid pain:  Use a hemorrhoid cream or a medicine that you put into your anus, called a suppository, that has hydrocortisone. You can buy either without a prescription.  Wipe the area with pads that have witch hazel or a numbing agent.  Soak your anal area in plain warm water for 10 to 15 minutes 2 to 3 times a day.  You might be afraid to have a bowel movement because you don't want to make the pain of hemorrhoids or your episiotomy wound worse. Take steps to keep stools soft and regular. Eat foods high in fiber, including fruits, vegetables and whole grains. Drink plenty of water. Ask your healthcare professional about a stool softener, if needed.  Sore breasts  A few days after giving birth, you might have full, firm, sore  breasts. That's because your breast tissue overfills with milk, blood and other fluids. This condition is called engorgement. Breastfeed your baby often on both breasts to help keep them from overfilling.  If your breasts are engorged, your baby might have trouble attaching for breastfeeding. To help your baby latch on, you can use your hand or a breast pump to let out some breast milk before feeding your baby. That process is called expressing.  To ease sore breasts, put warm washcloths on them or take a warm shower before breastfeeding or expressing. That can make it easier for the milk to flow. Between feedings, put cold washcloths on your breasts. Pain relievers you can buy without a prescription might help too.  If you're not breastfeeding, wear a bra that supports your breasts, such as a sports bra. Don't pump your breasts or express the milk. That causes your breasts to make more milk. Putting ice packs on your breasts can ease discomfort. Pain relievers available without a prescription also can be helpful.  Hair loss and skin changes  During pregnancy, higher hormone levels mean your hair grows faster than it sheds. The result is more hair on your head. But for up to five months after giving birth, you lose more hair than you grow. This hair loss stops over time.  Stretch marks on the skin don't go away after delivery. But in time, they fade. Expect any skin that got darker during pregnancy, such as dark patches on your face, to fade slowly too.  Mood changes  Childbirth can trigger a lot of feelings. Many people have a period of feeling down or anxious after giving birth, sometimes called the baby blues. Symptoms include mood swings, crying spells, anxiety and trouble sleeping. These feelings often go away within two weeks. In the meantime, take good care of yourself. Share your feelings, and ask your partner, loved ones or friends for help.  If you have large mood swings, don't feel like eating, are very  tired and lack king in life shortly after childbirth, you might have postpartum depression. Contact your healthcare professional if you think you might be depressed. Be sure to seek help if:  Your symptoms don't go away on their own.  You have trouble caring for your baby.  You have a hard time doing daily tasks.  You think of harming yourself or your baby.    Medicines and counseling often can ease postpartum depression.  Please talk to your provider if you are interested in either of these treatments.  Weight loss  It's common to still look pregnant after giving birth. Most people lose about 13 pounds (6 kilograms) during delivery. This loss includes the weight of the baby, placenta and amniotic fluid.  In the days after delivery, you'll lose more weight from leftover fluids. After that, a healthy diet and regular exercise can help you to return to the weight you were before pregnancy.  Postpartum checkups  The American College of Obstetricians and Gynecologists says that postpartum care should be an ongoing process rather than a single visit after delivery. Check in with your healthcare professional within 2 to 3 weeks after delivery by phone or in person to talk about any issues you've had since giving birth.  Within 6 to 12 weeks after delivery, see your healthcare professional for a complete postpartum exam. During this visit, your healthcare professional does a physical exam and checks your belly, vagina, cervix and uterus to see how well you're healing.  Things to talk about at this visit include:  Your mood and emotional well-being.  How well you're sleeping.  Other symptoms you might have, such as tiredness.  Birth control and birth spacing.  Baby care and feeding.  When you can start having sex again.  What you can do about pain with sex or not wanting to have sex.  How you're adjusting to life with a new baby.  This checkup is a chance for you and your healthcare professional to make sure you're OK. It's  also a time to get answers to questions you have about life after giving birth

## 2025-06-01 NOTE — PROGRESS NOTES
Pt admitted to L&D for active TOLAC, transferred to  103 via ambulatory w/ RN and significant other @ side. POC discussed, all questions answered, pt oriented to room.

## 2025-06-01 NOTE — L&D DELIVERY NOTE
John Roberts [[id number]]      Labor Events     labor?: No   steroids?: None  Antibiotics received during labor?: No  Rupture date/time: 2025 1047     Rupture type: AROM  Fluid color: Clear  Labor type: Spontaneous Onset of Labor  Intrapartum & labor complications: Other - see comments, Variable decelerations  Intrapartum & labor complications comment: TOLAC       Labor Event Times    Labor onset date/time: 2025 0500  Dilation complete date/time: 2025 1152  Start pushing date/time: 2025 11:56        Presentation    Presentation: Vertex       Operative Delivery    Operative Vaginal Delivery: No                Shoulder Dystocia    Shoulder Dystocia: No       Anesthesia    Method: None              [place] Delivery      Head delivery date/time: 2025 12:32:31   Delivery date/time:  25 12:32:40   Delivery type: Vaginal birth after caesarian    Details:     Delivery location: delivery room       Delivery Providers    Delivering Clinician: Gavi Miranda MD   Delivery personnel:  Provider Role   Chelo León, TREMAYNE Baby Nurse   Tamra Thurman RN Delivery Nurse             Cord    Vessels: 3 Vessels  Complications: None  Timed cord clamping: Yes  Time in sec: 60  Cord blood disposition: to lab  Gases sent?: No       Resuscitation    Method: None        Measurements      Weight: 3740 g 8 lb 3.9 oz Length: 53.3 cm     Head circum.: 34.5 cm Chest circum.: 38.5 cm      Abdominal circum.: 34 cm           Placenta    Date/time: 2025 12:35  Removal: Spontaneous  Appearance: Intact  Disposition: held for future pathology       Apgars    Living status: Living   Apgar Scoring Key:    0 1 2    Skin color Blue or pale Acrocyanotic Completely pink    Heart rate Absent <100 bpm >100 bpm    Reflex irritability No response Grimace Cry or active withdrawal    Muscle tone Limp Some flexion Active motion    Respiratory effort Absent Weak cry; hypoventilation Good,  crying              1 Minute:  5 Minute:  10 Minute:  15 Minute:  20 Minute:      Skin color:        Heart rate:        Reflex irritablity:        Muscle tone:        Respiratory effort:        Total:           [place] disposition: with mother       Skin to Skin    Skin to skin initiated date/time: 2025 1243  Skin to skin with: Mother       Vaginal Count    Initial count RN: Sara Pizano RN  Initial count Tech: Byriene, Ana   Sponges   Sharps    Initial counts 11   0    Final counts 11   2    Final count RN: Tamra Thurman RN  Final count MD: Gavi Miranda MD       Lacerations    Episiotomy: None  Perineal lacerations: 2nd Repaired?: Yes     Vaginal laceration?: No      Cervical laceration?: No    Clitoral laceration?: No    Quantitative blood loss (mL): 73                Vaginal Delivery Note          Denisha Ree Armando Patient Status:  Inpatient    1985 MRN [id number]   [facility] LABOR & DELIVERY Attending Gavi Miranda MD   Hosp Day # 0 PCP Ivette Brennan DO     Date of Delivery: 25    Pre Op Dx:  IUP at Term    Post Op Dx: Same - delivered    Op: Vaginal birth after      Surgeon: Gavi Miranda MD      Anesthesia: None per patient requestl      Indications:  Patient is a 40 year old  at 39w2d who presented for TOLAC management. She came in earlier this morning with c/o uterine contractions and was 4 cm dilated. She was then admitted and continued to progress with expectant management as her uterine contractions increased in frequency. Attempt was made at AROM but noted to already be SROM. She progressed to complete cervical dilation.     Findings:    Sex: male     Weight:3740g      Apgars: 9/9      Lacerations: 2nd perineal laceration  Nuchal: no      Procedure:  The patients was placed in the dorsolithotomy position and prepped.  She was encouraged to push.  As the head was delivered in direct OA position, the perineum was  supported to decrease the risk of tearing. The shoulders rotated easily and delivery was completed without complication.  Bulb suction was performed.  The cord was doubly clamped then cut after 60 seconds of cord pulsation noted.  The baby was placed on the mother's abdomen at her request. The cord blood was sampled. IV Pitocin was initiated. Placenta delivered spontaneosly by uterine massage. Uterus noted to be firm. Good hemostasis noted. The perineum, vaginal mucosa and cervix was then examined.     The 2nd degree perineal laceration repaired with 2-0 rapide vicryl. Uterine massage was performed and more bleeding and clots were noted but they did slow. Decision made to place 1000 mcg OR cytotec for prophylaxis. Bleeding was then minimal.  The patient was then moved to the supine position in stable condition.  Sponge and instrument counts were correct.    Complications:  None  QBL: 218mL    The American College of Obstetricians and Gynecologists (ACOG) defines postpartum hemorrhage (PPH) as cumulative blood loss > 1,000 mL or blood loss accompanied by signs or symptoms of hypovolemia within 24 hours after the birth process (includes intrapartum loss) regardless of route of delivery    The patient is stable, asymptomatic, and blood loss is within the expected amount following delivery. Standard treatment provided to prevent postpartum hemorrhage. Patient does not meet ACOG criteria for hemorrhage at this time.       Mother and infant in good condition.    Gavi Miranda MD   [facility] - OBGYN        Note to patient and family   The 21st Century Cures Act makes medical notes available to patients in the interest of transparency.  However, please be advised that this is a medical document.  It is intended as [id number] communication.  It is written and medical language may contain abbreviations or verbiage that are technical and unfamiliar.  It may appear blunt or direct.  Medical documents are intended to carry  relevant information, facts as evident, and the clinical opinion of the practitioner.

## 2025-06-02 ENCOUNTER — MED REC SCAN ONLY (OUTPATIENT)
Facility: CLINIC | Age: 40
End: 2025-06-02

## 2025-06-02 VITALS
RESPIRATION RATE: 16 BRPM | HEART RATE: 96 BPM | WEIGHT: 179 LBS | SYSTOLIC BLOOD PRESSURE: 127 MMHG | HEIGHT: 64 IN | BODY MASS INDEX: 30.56 KG/M2 | OXYGEN SATURATION: 100 % | TEMPERATURE: 98 F | DIASTOLIC BLOOD PRESSURE: 71 MMHG

## 2025-06-02 PROBLEM — O14.13 PRE-ECLAMPSIA, SEVERE, THIRD TRIMESTER (HCC): Status: ACTIVE | Noted: 2025-06-02

## 2025-06-02 PROBLEM — O14.13 PRE-ECLAMPSIA, SEVERE, THIRD TRIMESTER (HCC): Status: ACTIVE | Noted: 2025-06-01

## 2025-06-02 LAB
BASOPHILS # BLD AUTO: 0.07 X10(3) UL (ref 0–0.2)
BASOPHILS NFR BLD AUTO: 0.3 %
CREAT UR-SCNC: 34 MG/DL
EOSINOPHIL # BLD AUTO: 0.05 X10(3) UL (ref 0–0.7)
EOSINOPHIL NFR BLD AUTO: 0.2 %
ERYTHROCYTE [DISTWIDTH] IN BLOOD BY AUTOMATED COUNT: 13.5 %
HCT VFR BLD AUTO: 32.8 % (ref 35–48)
HGB BLD-MCNC: 11.3 G/DL (ref 12–16)
IMM GRANULOCYTES # BLD AUTO: 0.23 X10(3) UL (ref 0–1)
IMM GRANULOCYTES NFR BLD: 1.1 %
LYMPHOCYTES # BLD AUTO: 2.43 X10(3) UL (ref 1–4)
LYMPHOCYTES NFR BLD AUTO: 11.5 %
MCH RBC QN AUTO: 32.4 PG (ref 26–34)
MCHC RBC AUTO-ENTMCNC: 34.5 G/DL (ref 31–37)
MCV RBC AUTO: 94 FL (ref 80–100)
MONOCYTES # BLD AUTO: 1.07 X10(3) UL (ref 0.1–1)
MONOCYTES NFR BLD AUTO: 5.1 %
NEUTROPHILS # BLD AUTO: 17.28 X10 (3) UL (ref 1.5–7.7)
NEUTROPHILS # BLD AUTO: 17.28 X10(3) UL (ref 1.5–7.7)
NEUTROPHILS NFR BLD AUTO: 81.8 %
PLATELET # BLD AUTO: 201 10(3)UL (ref 150–450)
PROT UR-MCNC: 8.4 MG/DL (ref ?–14)
PROT/CREAT UR-RTO: 0.25
RBC # BLD AUTO: 3.49 X10(6)UL (ref 3.8–5.3)
WBC # BLD AUTO: 21.1 X10(3) UL (ref 4–11)

## 2025-06-02 RX ORDER — NIFEDIPINE 30 MG
30 TABLET, EXTENDED RELEASE ORAL DAILY
Qty: 30 TABLET | Refills: 0 | Status: SHIPPED | OUTPATIENT
Start: 2025-06-03

## 2025-06-02 RX ORDER — IBUPROFEN 600 MG/1
600 TABLET, FILM COATED ORAL EVERY 6 HOURS
Qty: 30 TABLET | Refills: 0 | Status: SHIPPED | OUTPATIENT
Start: 2025-06-02

## 2025-06-02 NOTE — PROGRESS NOTES
OB progress note    40 year old  female PPD#1 s/p  at 39w2d on 25 after presenting in labor. Pregnancy complicated by AMA, history of  x 1, history of successful  x 1, ADHD, anxiety, BIRADs 3 mammogram. Patient met criteria for gestational hypertension with severe features (\"pre-eclampsia\" per ACOG) this admission    Pre-eclampsia with severe features  -Diagnosed by elevated blood pressures. Mild range BP since admission. Sustained severe range BP during pushing.   -Admission labs ok, uric 5.4. Urine P/C was not sent. Will check   -delivered 25 at 1232  -received IV labetalol x 1 dose on  at 1411  -nifedipine 30 mg XR daily started  at 1501  -BP currently normal to pre-hypertensive range, actually getting somewhat low. Will likely need to hold her next nifedipine.      Afebrile, VSS  Pain controlled  Rh+, GBS neg   Ambulation encouraged  Circumcision for male infant declined by patient.     Disposition - patient would like to go home today. We discussed checking BP at home BID & signs/symptoms of pre-eclampsia. She was encouraged to get BP cuff & have BP check appointment.     Subjective: Pain controlled. Lochia normal. Eating, ambulating, voiding without difficulty. Is breastfeeding. Had a transient mild headache thinks due to caffeine withdrawal. It resolved. No vision changes, epigastric pain, chest pain, shortness of breath, leg pain. Swelling mild in ankles.      Vitals:    25 0745 25 0915 25 1156 25 1500   BP: 121/70 120/69 109/57 127/71   BP Location: Left arm   Left arm   Pulse: 87 89 93 96   Resp: 16      Temp: 98.3 °F (36.8 °C)      TempSrc: Oral      SpO2:       Weight:       Height:         Temp:  [98.3 °F (36.8 °C)-98.7 °F (37.1 °C)] 98.3 °F (36.8 °C)  Pulse:  [81-96] 96  Resp:  [16] 16  BP: (109-132)/(57-72) 127/71      cetirizine  10 mg Oral Daily    prenatal vitamin with DHA  1 capsule Oral Daily    ibuprofen  600 mg Oral Q6H    docusate  sodium  100 mg Oral BID@0600,1800    NIFEdipine ER  30 mg Oral Daily     Exam:  Gen A&O, NAD  CV RRR  Lungs CTAB  Abd soft, nontender, fundus firm under umbilicus  Ext nontender, trace BLE  edema in ankles.     Recent Labs   Lab 06/02/25  0815   WBC 21.1*   HGB 11.3*   .0   NE 17.28*       Abigail Bedolla MD

## 2025-06-02 NOTE — DISCHARGE SUMMARY
[facility]   part of [facility]    Discharge Summary    Denisha Roberts Patient Status:  Inpatient    1985 MRN [id number]   Location [facility] 1SW-J Attending Gavi Miranda MD   Hosp Day # 1 PCP Ivette Brennan DO     Date of Admission: 2025    Date of Discharge: 2025     Admission Diagnoses:   IUP at 39w2d  History of  section x 1 desiring trial of labor   Active labor, desiring trial of labor after  section     Discharge Diagnosis:   Status post trial of labor after  section  Status post successful vaginal birth after  section at 39w2d   Gestational hypertension with severe features (by blood pressure)     Primary OB Clinician: Chioma Paris Keeney, Hrvojevic     [facility] Course:     Denisha Roberts is a 40 year old  female at 39w2d  39w2d who presented for TOLAC management. She came in earlier this morning with c/o uterine contractions and was 4 cm dilated. She was then admitted and continued to progress with expectant management as her uterine contractions increased in frequency. Attempt was made at AROM but noted to already be SROM. She progressed to complete cervical dilation.      Her current obstetrical history is significant for:  History of CS x 1  History of successful   AMA     Patient reports doing well. Walking around the room. Is not going to want an epidural - did not have one with her last pregnancy. Declines AROM, would like expectant management.      25 Vaginal birth after caesarian viable male infant at 39w2d. 2nd degree laceration.  Decision made to place 1000 mcg MD cytotec for prophylaxis. QBL: 218mL     Mildly elevated blood pressures on admission with sustained severe range BP while pushing. IV labetalol 20 mg x 1 dose given. Was started on nifedipine 30 mg XR daily on . Patient met criteria for gestational hypertension with severe features (\"pre-eclampsia\" per ACOG) this admission.  She was asymptomatic. Labs on admission with uric acid 5.4, urine P/C normal. No evidence of HELLP syndrome.      Blood pressure continued to improve after delivery. At one point BP down to 109/57 shortly before 2nd dose of nifedipine due. Patient desiring discharge home at 24 hr after delivery. We discussed checking BP at home BID & signs/symptoms of pre-eclampsia. She was encouraged to get BP cuff & have BP check appointment in 3-5 days.      Discharge Plan:   Discharge Condition: Stable  Early Discharge:  NO    Discharge medications:     Discharge Medications        START taking these medications        Instructions Prescription details   ibuprofen 600 MG Tabs  Commonly known as: Motrin      Take 1 tablet (600 mg total) by mouth every 6 (six) hours.   Quantity: 30 tablet  Refills: 0     NIFEdipine ER 30 MG Tb24  Commonly known as: Adalat CC  Start taking on: Valorie 3, 2025      Take 1 tablet (30 mg total) by mouth daily. Hold for blood pressure less than 110/70   Quantity: 30 tablet  Refills: 0            CONTINUE taking these medications        Instructions Prescription details   CLARITIN OR       Refills: 0     ferrous sulfate 325 (65 FE) MG Tbec      Take 1 tablet (325 mg total) by mouth every other day.   Refills: 0     prenatal vitamin with DHA 27-0.8-228 MG Caps      Take 1 capsule by mouth in the morning. -Gummy.   Refills: 0            ASK your doctor about these medications        Instructions Prescription details   busPIRone 5 MG Tabs  Commonly known as: Buspar      Take 1 tablet (5 mg total) by mouth daily with breakfast.   Quantity: 30 tablet  Refills: 1               Where to Get Your Medications        These medications were sent to [facility]/pharmacy #[id number] - [place] - [address], [phone], [phone]  [facility] [address] [place] [address]      Phone: [phone]   ibuprofen 600 MG Tabs  NIFEdipine ER 30 MG Tb24              Discharge Diet: As  tolerated    Discharge Activity: Pelvic rest until cleared    Follow up:      Follow-up Information       [facility] - OB/GYN. Schedule an appointment as soon as possible for a visit in 3 day(s).    Specialty: Obstetrics/Gynecology  Why: BP check in 3-5 days  Contact information:  [address]  [facility] [id number] [phone]  Additional information:  Masks are optional for all patients and visitors, unless otherwise indicated.   Note: A $50 fee will be charged for missed appointments or same-day cancellations. Please provide 24 hours' notice if you need to cancel or reschedule your appointment.             [facility] - OB/GYN. Schedule an appointment as soon as possible for a visit in 6 week(s).    Specialty: Obstetrics/Gynecology  Why: Postpartum visit  Contact information:  [address]  [facility] [id number] [phone]  Additional information:  Masks are optional for all patients and visitors, unless otherwise indicated.   Note: A $50 fee will be charged for missed appointments or same-day cancellations. Please provide 24 hours' notice if you need to cancel or reschedule your appointment.                               Other Discharge Instructions:         Hypertension related to pregnancy/postpartum    -Watch for symptoms of pre-eclampsia (severe or persistent headache not relieved with a dose of tylenol, visual disturbances, persistent or severe upper abdominal pain, shortness of breath, chest pain)  -Please obtain a blood pressure cuff for home from the list of US Blood Pressure Validated Device Listing- see www.validatebp.org  -Check blood pressure (and heart rate if you can) 2-3 times per day & more frequently if feeling poorly. Keep log & bring to visits.      If BP is 140/90 or higher (either number) you will likely be prescribed oral antihypertensive medication.  Check blood pressure before a dose of medication. Can hold the medicine if you feel your blood pressure is getting too low (less than 110/70) or you are lightheaded.      If BP is 160/110 (either number) or higher, or you develop symptoms of pre-eclampsia, please immediately go to the emergency department at [facility] & let them know you may have pre-eclampsia. You will likely require IV antihypertensives and IV magnesium sulfate to prevent stroke and seizures.      Postpartum care: What to expect after a vaginal birth  When caring for a , you might forget to care for yourself. But that's important too. Learn what's involved as you recover from giving birth.  Pregnancy changes a body in more ways than you might expect. And that doesn't stop when you give birth. Here's what can happen physically and emotionally after a vaginal delivery.  Vaginal soreness  You might have had a tear in your vagina during delivery. Or your healthcare professional may have made a cut in the vaginal opening, called an episiotomy, to make delivery easier. The wound may hurt for a few weeks. Large tears can take longer to heal. To ease the pain:  Sit on a pillow or padded ring.  Cool the area with an ice pack. Or put a chilled witch hazel pad between a sanitary napkin and the area between your vaginal opening and anus. That area is called the perineum.  Use a squirt bottle to spray warm water over the perineum as you urinate.  Sit in a warm bath just deep enough to cover your buttocks and hips for five minutes. Use cold water if it feels better.  Take a pain reliever that you can buy without a prescription. Ask your healthcare professional about a numbing spray or cream, if needed.  .Avoid straining due to constipation through adequate hydration and a diet that incorporates whole foods that are plant-based.  If this is not enough to keep your stools a toothpaste like consistency, please add over-the-counter fiber  supplementation like Metamucil or a daily osmotic laxative like Miralax.  You can take one capful of Miralax with water or juice each morning and, as needed, in the evening.  While having a bowel movement, you can use can also use a stool under your feet or a squatty potty to help prevent straining.  Tell your healthcare professional if you have intense pain, lasting pain or if the pain gets worse. It could be a sign of an infection.    Vaginal discharge  After delivery, a mix of blood, mucus and tissue from the uterus comes out of the vagina. This is called discharge. The discharge changes color and lessens over 4 to 6 weeks after a baby is born. It starts bright red, then turns darker red. After that, it usually turns yellow or white. The discharge then slows and becomes watery until it stops.  Contact your healthcare professional if blood from your vagina soaks a pad hourly for two hours in a row, especially if you also have a fever, pelvic pain or tenderness.  Contractions  You might feel contractions, sometimes called afterpains, for a few days after delivery. These contractions often feel like menstrual cramps. They help keep you from bleeding too much because they put pressure on the blood vessels in the uterus. Afterpains are common during breastfeeding. That's because breastfeeding causes the release of the hormone oxytocin.  To ease the pain, you can use acetaminophen (Tylenol, others) or ibuprofen (Advil, Motrin IB, others).  Leaking urine  Pregnancy, labor and a vaginal delivery can stretch or hurt your pelvic floor muscles. These muscles support the uterus, bladder and rectum. As a result, some urine might leak when you sneeze, laugh or cough. The leaking usually gets better within a week. But it might go on longer. Leaking urine also is called incontinence.  Until the leaking stops, wear sanitary pads. Do pelvic floor muscle training, also called Kegels, to tone your pelvic floor muscles and help  control your bladder.  To do Kegels, think of sitting on a marble. Tighten your pelvic muscles as if you're lifting the marble. Try it for three seconds at a time, then relax for a count of three. Work up to doing the exercise 10 to 15 times in a row, at least three times a day. To make sure you're doing Kegels right, it might help to see a physical therapist who specializes in pelvic floor exercises.  Hemorrhoids and bowel movements  If you notice pain during bowel movements and feel swelling near your anus, you might have swollen veins in the anus or lower rectum, called hemorrhoids. To ease hemorrhoid pain:  Use a hemorrhoid cream or a medicine that you put into your anus, called a suppository, that has hydrocortisone. You can buy either without a prescription.  Wipe the area with pads that have witch hazel or a numbing agent.  Soak your anal area in plain warm water for 10 to 15 minutes 2 to 3 times a day.  You might be afraid to have a bowel movement because you don't want to make the pain of hemorrhoids or your episiotomy wound worse. Take steps to keep stools soft and regular. Eat foods high in fiber, including fruits, vegetables and whole grains. Drink plenty of water. Ask your healthcare professional about a stool softener, if needed.  Sore breasts  A few days after giving birth, you might have full, firm, sore breasts. That's because your breast tissue overfills with milk, blood and other fluids. This condition is called engorgement. Breastfeed your baby often on both breasts to help keep them from overfilling.  If your breasts are engorged, your baby might have trouble attaching for breastfeeding. To help your baby latch on, you can use your hand or a breast pump to let out some breast milk before feeding your baby. That process is called expressing.  To ease sore breasts, put warm washcloths on them or take a warm shower before breastfeeding or expressing. That can make it easier for the milk to flow.  Between feedings, put cold washcloths on your breasts. Pain relievers you can buy without a prescription might help too.  If you're not breastfeeding, wear a bra that supports your breasts, such as a sports bra. Don't pump your breasts or express the milk. That causes your breasts to make more milk. Putting ice packs on your breasts can ease discomfort. Pain relievers available without a prescription also can be helpful.  Hair loss and skin changes  During pregnancy, higher hormone levels mean your hair grows faster than it sheds. The result is more hair on your head. But for up to five months after giving birth, you lose more hair than you grow. This hair loss stops over time.  Stretch marks on the skin don't go away after delivery. But in time, they fade. Expect any skin that got darker during pregnancy, such as dark patches on your face, to fade slowly too.  Mood changes  Childbirth can trigger a lot of feelings. Many people have a period of feeling down or anxious after giving birth, sometimes called the baby blues. Symptoms include mood swings, crying spells, anxiety and trouble sleeping. These feelings often go away within two weeks. In the meantime, take good care of yourself. Share your feelings, and ask your partner, loved ones or friends for help.  If you have large mood swings, don't feel like eating, are very tired and lack king in life shortly after childbirth, you might have postpartum depression. Contact your healthcare professional if you think you might be depressed. Be sure to seek help if:  Your symptoms don't go away on their own.  You have trouble caring for your baby.  You have a hard time doing daily tasks.  You think of harming yourself or your baby.    Medicines and counseling often can ease postpartum depression.  Please talk to your provider if you are interested in either of these treatments.  Weight loss  It's common to still look pregnant after giving birth. Most people lose about 13  pounds (6 kilograms) during delivery. This loss includes the weight of the baby, placenta and amniotic fluid.  In the days after delivery, you'll lose more weight from leftover fluids. After that, a healthy diet and regular exercise can help you to return to the weight you were before pregnancy.  Postpartum checkups  The American College of Obstetricians and Gynecologists says that postpartum care should be an ongoing process rather than a single visit after delivery. Check in with your healthcare professional within 2 to 3 weeks after delivery by phone or in person to talk about any issues you've had since giving birth.  Within 6 to 12 weeks after delivery, see your healthcare professional for a complete postpartum exam. During this visit, your healthcare professional does a physical exam and checks your belly, vagina, cervix and uterus to see how well you're healing.  Things to talk about at this visit include:  Your mood and emotional well-being.  How well you're sleeping.  Other symptoms you might have, such as tiredness.  Birth control and birth spacing.  Baby care and feeding.  When you can start having sex again.  What you can do about pain with sex or not wanting to have sex.  How you're adjusting to life with a new baby.  This checkup is a chance for you and your healthcare professional to make sure you're OK. It's also a time to get answers to questions you have about life after giving birth      Abigail Bedolla MD

## 2025-06-03 ENCOUNTER — PATIENT MESSAGE (OUTPATIENT)
Facility: CLINIC | Age: 40
End: 2025-06-03

## 2025-06-04 ENCOUNTER — TELEPHONE (OUTPATIENT)
Dept: OBGYN UNIT | Facility: HOSPITAL | Age: 40
End: 2025-06-04

## 2025-06-04 ENCOUNTER — TELEPHONE (OUTPATIENT)
Facility: CLINIC | Age: 40
End: 2025-06-04

## 2025-06-04 NOTE — PROGRESS NOTES
Cradle call completed. Mom and baby care reviewed. All questions answered. Cradle call letters sent via Soonr.

## 2025-06-05 ENCOUNTER — POSTPARTUM (OUTPATIENT)
Facility: CLINIC | Age: 40
End: 2025-06-05
Payer: COMMERCIAL

## 2025-06-05 VITALS
BODY MASS INDEX: 28.68 KG/M2 | HEART RATE: 76 BPM | HEIGHT: 64 IN | SYSTOLIC BLOOD PRESSURE: 130 MMHG | DIASTOLIC BLOOD PRESSURE: 78 MMHG | WEIGHT: 168 LBS

## 2025-06-05 DIAGNOSIS — O34.219: ICD-10-CM

## 2025-06-05 DIAGNOSIS — Z01.30 BLOOD PRESSURE CHECK: Primary | ICD-10-CM

## 2025-06-05 PROCEDURE — 3075F SYST BP GE 130 - 139MM HG: CPT | Performed by: OBSTETRICS & GYNECOLOGY

## 2025-06-05 PROCEDURE — 3008F BODY MASS INDEX DOCD: CPT | Performed by: OBSTETRICS & GYNECOLOGY

## 2025-06-05 PROCEDURE — 3078F DIAST BP <80 MM HG: CPT | Performed by: OBSTETRICS & GYNECOLOGY

## 2025-06-05 NOTE — PROGRESS NOTES
[facility]  Obstetrics and Gynecology   Postpartum Progress Note    CC:   Chief Complaint   Patient presents with    Postpartum Care     Delivered 25 baby boy, vaginal and breast feeding.    Other     Bp check.        Subjective:     Denisha Roberts is a 40 year old  female - postpartum visit.   Patient's last menstrual period was 2024.     25 -  at 39w2d -AMA, h/o  x 1, h/o  x 1. Gestational hypertension with severe features (by blood pressure). She was not needing PO antihypertensive medications postpartum but was discharged home with Rx for nifedipine 30 mg XR daily in case her BP hit 140/90 or higher.     2025 Today she reports she is doing well. Did not need to take any nifedipine.    BP at home - 120/80.   No HA, vision changes, epigastric pain, SOB, CP, cough, leg pain.   Here in clinic /78  Ankles are a little swollen & a little jelly like in sensation over the past few days. Does think she has been sitting with her legs dangling more than she probably should.     Lochia - reasonable amount. Some nickel sized dark clots at times after changing positions    Baby Anand doing well  Breastfeeding.   Breasts N  Period since delivery? NA  Pain N   Mood doing ok       Patient Care Team:  Ivette Brennan DO as PCP - General (Family Medicine)  Rhiannon Haley PA-C as Physician Assistant (Family Practice)     Review of Systems    GYN Hx  10/03/2022 ASCUS, HPV negative   1/3/24 Pap negative     OB History    Para Term  AB Living   3 3 3 0 0 3   SAB IAB Ectopic Multiple Live Births   0 0  0 3      # Outcome Date GA Lbr José Luis/2nd Weight Sex Type Anes PTL Lv   3 Term 25 39w2d 06:52 / 00:40 8 lb 3.9 oz (3.74 kg) M VAGINAL ALEXEI None N JANETT      Complications: Other - see comments, Variable decelerations   2 Term 16 40w0d  8 lb 8 oz (3.856 kg) M NORMAL SPONT   JANETT   1 Term 13 37w0d  8 lb 2 oz (3.685 kg) F Caesarean   JANETT     Past  Medical History:   Diagnosis Date    Allergic rhinitis     Anxiety     ASCUS of cervix with negative high risk HPV 10/03/2022    COVID-19 virus infection 2020    Depression     Depressive disorder in remission 2020    PMDD (premenstrual dysphoric disorder)     Pre-eclampsia, severe, third trimester (HCC) 2025    Vasovagal episode 2020    Vasovagal syncope 2021    at the [facility]      Past Surgical History:   Procedure Laterality Date      2013    [place]          Screening pap smear by phys         Allergies:  Allergies   Allergen Reactions    Avocado RASH and ITCHING    Dust UNKNOWN    Ragweed UNKNOWN       Medications:  Current Outpatient Medications   Medication Sig Dispense Refill    ibuprofen 600 MG Oral Tab Take 1 tablet (600 mg total) by mouth every 6 (six) hours. 30 tablet 0    Loratadine (CLARITIN OR)       ferrous sulfate 325 (65 FE) MG Oral Tab EC Take 1 tablet (325 mg total) by mouth every other day.      prenatal vitamin with DHA 27-0.8-228 MG Oral Cap Take 1 capsule by mouth in the morning. -Gummy.      NIFEdipine ER 30 MG Oral Tablet 24 Hr Take 1 tablet (30 mg total) by mouth daily. Hold for blood pressure less than 110/70 (Patient not taking: Reported on 2025) 30 tablet 0    busPIRone 5 MG Oral Tab Take 1 tablet (5 mg total) by mouth daily with breakfast. (Patient not taking: Reported on 2025) 30 tablet 1        Social History     Socioeconomic History    Marital status:    Tobacco Use    Smoking status: Former     Types: Cigarettes     Start date: 2024     Quit date:      Years since quitting: 3.4    Smokeless tobacco: Former   Vaping Use    Vaping status: Never Used   Substance and Sexual Activity    Alcohol use: Not Currently     Comment: special occasions    Drug use: No    Sexual activity: Not Currently     Partners: Male   Other Topics Concern     Service No    Blood Transfusions No    Caffeine  Concern Yes     Comment: 1 cup of coffee and 1 can of soda daily    Occupational Exposure No    Hobby Hazards No    Sleep Concern No    Stress Concern No    Weight Concern No    Special Diet No    Back Care No    Exercise Yes     Comment: active at work    Bike Helmet No    Seat Belt Yes    Self-Exams No        Family History   Problem Relation Age of Onset    High Cholesterol Mother     Hypertension Father     Heart Attack Father         lates 40's - stent    Heart Surgery Father     No Known Problems Sister     No Known Problems Sister     No Known Problems Sister     No Known Problems Daughter     No Known Problems Son     Cancer Maternal Grandmother         Lung    Colon Cancer Maternal Grandfather     Diabetes Maternal Grandfather     Cancer Paternal Grandfather         Lung    Breast Cancer Maternal Aunt 50        50s    Breast Cancer Paternal Aunt 50        50s       Depression Scale Total: 7 (11/27/2024  1:10 PM)      Depression Screening (PHQ-2/PHQ-9): Over the LAST 2 WEEKS                        Objective:     Vitals:    06/05/25 1215   BP: 130/78   Pulse: 76   Weight: 168 lb (76.2 kg)   Height: 64\"         Body mass index is 28.84 kg/m².  Physical Exam  Vitals and nursing note reviewed.   Constitutional:       General: She is not in acute distress.     Appearance: Normal appearance. She is not ill-appearing.   HENT:      Head: Normocephalic and atraumatic.   Eyes:      Extraocular Movements: Extraocular movements intact.      Conjunctiva/sclera: Conjunctivae normal.   Cardiovascular:      Rate and Rhythm: Normal rate and regular rhythm.      Heart sounds: No murmur heard.  Pulmonary:      Effort: Pulmonary effort is normal.      Breath sounds: Normal breath sounds.   Abdominal:      Comments: Deferred. Patient holding baby   Musculoskeletal:      Right lower leg: Edema present.      Left lower leg: Edema present.      Comments: BLE edema 1+ at ankles    Neurological:      Mental Status: She is alert.    Psychiatric:         Mood and Affect: Mood normal.         Behavior: Behavior normal.         Thought Content: Thought content normal.         Judgment: Judgment normal.           Labs:         Assessment:     Denisha Roberts is a 40 year old  female - postpartum visit     Diagnoses and all orders for this visit:    Blood pressure check    Vaginal birth after  delivery (HCC)    Pre-eclampsia, delivered (HCC)         Plan:     Pre-eclampsia with severe features  -met criteria & had IV labetalol x 1 dose in hospital, but BP improved after that and required no additional PO antihypertensives  -BP prehypertensive range here in clinic today  -recommend continue BP checks for at least the next few weeks     1/3/24 Pap negative   up to date by current ASCCP guidelines.     HPV vaccine deferred   Contraception deferred    RTC for 6 wk postpartum visit    Abigail Bedolla MD  [facility] - OBGYN

## 2025-06-16 ENCOUNTER — TELEPHONE (OUTPATIENT)
Facility: CLINIC | Age: 40
End: 2025-06-16

## 2025-06-16 NOTE — TELEPHONE ENCOUNTER
Spoke with patient aware lochia can have a bad smell due to the blood breaking down.  So it is constantly going to smell like old blood until her uterus is cleared out and normal cycles resume.  If she is having other symptoms (itchiness, burning, pelvic pain etc), she can come in for an exam and vaginal swab.    To monitor and call if she develops itching, irritation, Burning, Or pelvic pain. Verbalized understanding.

## 2025-06-16 NOTE — TELEPHONE ENCOUNTER
Spoke with patient. She states her lochia smells like \"garbage\". It is all the time not just in the morning or when she needs a pad change. No fever or abdominal pain. Bleeding is sometimes dark and sometimes bright red. Not saturating pads. Explained to her lochia can have an old, stale smell that is normal. She would like the doctors recommendation. Will route for advise. Understanding verbalized.

## 2025-07-14 ENCOUNTER — POSTPARTUM (OUTPATIENT)
Facility: CLINIC | Age: 40
End: 2025-07-14
Payer: COMMERCIAL

## 2025-07-14 VITALS
DIASTOLIC BLOOD PRESSURE: 70 MMHG | SYSTOLIC BLOOD PRESSURE: 122 MMHG | WEIGHT: 163 LBS | HEIGHT: 64 IN | BODY MASS INDEX: 27.83 KG/M2

## 2025-07-14 DIAGNOSIS — Z01.812 PRE-PROCEDURAL LABORATORY EXAMINATION: Primary | ICD-10-CM

## 2025-07-14 DIAGNOSIS — Z30.09 ENCOUNTER FOR GENERAL COUNSELING AND ADVICE ON CONTRACEPTIVE MANAGEMENT: ICD-10-CM

## 2025-07-14 LAB
CONTROL LINE PRESENT WITH A CLEAR BACKGROUND (YES/NO): YES YES/NO
KIT LOT #: NORMAL NUMERIC
PREGNANCY TEST, URINE: NEGATIVE

## 2025-07-14 RX ORDER — NORETHINDRONE ACETATE AND ETHINYL ESTRADIOL 1MG-20(21)
1 KIT ORAL DAILY
Qty: 84 TABLET | Refills: 3 | Status: SHIPPED | OUTPATIENT
Start: 2025-07-14 | End: 2026-07-14

## 2025-07-14 NOTE — PROGRESS NOTES
[facility]  Obstetrics and Gynecology   History & Physical      Chief complaint:   Chief Complaint   Patient presents with    Other     POSTPARTUM visit. 27 , MALE, BF. HX Pre-eclampsia       Subjective:     HPI: Denisha Roberts is a 40 year old  s/p  on  is presenting for post partum check.    - OB history complicated by:  History of CS x 1  Successful   AMA  Preeclampsia with SF     Post partum course:  - Vaginal bleeding: still irregular   - Lacerations/Incision: 2nd degree perineal laceration   - Diet: baseline  - BM: baseline  - Urinary: baseline   - Breastfeeding/bottlefeeding: breastfeeding   - Baby: doing well   - Mood: good   - Return to sex?: no  - Birth control: considering OCP       ROS negative unless otherwise stated above    OB History  OB History    Para Term  AB Living   3 3 3 0 0 3   SAB IAB Ectopic Multiple Live Births   0 0 0 0 3     OB History    Para Term  AB Living   3 3 3 0 0 3   SAB IAB Ectopic Multiple Live Births   0 0  0 3      # Outcome Date GA Lbr José Luis/2nd Weight Sex Type Anes PTL Lv   3 Term 27 39w2d 06:52 / 00:40 8 lb 3.9 oz (3.74 kg) M VAGINAL ALEXEI None N JANETT      Complications: Other - see comments, Variable decelerations   2 Term 18 40w0d  8 lb 8 oz (3.856 kg) M NORMAL SPONT   JANETT   1 Term 15 37w0d  8 lb 2 oz (3.685 kg) F Caesarean   JANETT       Depression Scale Total: 2 (2025 10:58 AM)    PHQ-2 SCORE: 0  , done 2024   Feeling tired or having little energy: 1    Trouble concentrating on things, such as reading the newspaper or watching television: 1    If you checked off any problems, how difficult have these problems made it for you to do your work, take care of things at home, or get along with other people?: Somewhat difficult          Meds:  Medications Ordered Prior to Encounter[1]    PMH:  Past Medical History[2]    All:  Allergies[3]    PSH:  Past Surgical History[4]    Social  History:  Short Social Hx on File[5]     Family History:  Family History[6]    Immunization History:  Immunization History   Administered Date(s) Administered    Covid-19 Vaccine Moderna 100 mcg/0.5 ml 02/15/2021, 03/12/2021, 12/08/2021    DTAP 08/23/2013    FLULAVAL 6 months & older 0.5 ml Prefilled syringe (22798) 09/21/2019, 01/17/2022, 10/03/2022, 01/03/2024    FLUZONE 6 months and older PFS 0.5 ml ([id number]) 01/17/2022    Haemophilus B Polysac Conj Vac Im 05/30/1991    Influenza A, H1N1 Vaccine 11/04/2009    MMR 09/10/1990, 12/27/1995    OPV 04/09/1991, 05/30/1991, 08/01/1991    TDAP 01/03/2024, 03/20/2025    Tb Intradermal Test 09/26/2017, 07/18/2020    Td 04/09/1991, 05/30/1991, 08/01/1991, 07/23/2001         Objective:     Vitals:    07/14/25 1053   BP: 122/70   Weight: 163 lb (73.9 kg)   Height: 64\"       Body mass index is 27.98 kg/m².    Physical Exam:     General: normal appearance  HEENT: normocephalic, no male pattern baldness, no acne    Respiratory: normal work of breathing, no extra use of accessory muscles  Cardiac: normal rate  MSK: normal range of motion  Neuro: normal movement, normal sensory  Skin: no abnormalities seen    Pelvic Exam:  - normal appearing vulva, perineum, anus  - normal appearing urethral meatus, urethra  - normal appearing vagina, well estrogenized with ruggae, physiologic discharge  - multiparous cervix without masses       Labs:  Lab Results   Component Value Date    WBC 21.1 (H) 06/02/2025    RBC 3.49 (L) 06/02/2025    HGB 11.3 (L) 06/02/2025    HCT 32.8 (L) 06/02/2025    MCV 94.0 06/02/2025    MCH 32.4 06/02/2025    MCHC 34.5 06/02/2025    RDW 13.5 06/02/2025    .0 06/02/2025        Lab Results   Component Value Date    GLU 81 06/01/2025    BUN 10 06/01/2025    BUNCREA 14.3 01/02/2020    CREATSERUM 0.70 06/01/2025    ANIONGAP 12 06/01/2025    GFRNAA 92 05/07/2022    GFRAA 106 05/07/2022    CA 10.0 06/01/2025    OSMOCALC 282 06/01/2025    ALKPHO 220 (H) 06/01/2025     AST 26 2025    ALT 18 2025    BILT 0.4 2025    TP 7.1 2025    ALB 4.3 2025    GLOBULIN 2.8 2025     2025    K 4.0 2025     2025    CO2 22.0 2025       Lab Results   Component Value Date    CHOLEST 165 2024    TRIG 42 2024    HDL 66 (H) 2024    LDL 90 2024    VLDL 7 2024    NONHDLC 99 2024        Lab Results   Component Value Date    T4F 1.0 2020    TSH 0.917 2024        No results found for: \"EAG\", \"A1C\"      Imaging:  No results found.     Assessment:     Denisha Roberts is a 40 year old  s/p  on  is presenting for post partum check.    #post partum  - meeting goals of care  - plan follow up for annual well woman exam    #irregular bleeding  - POCT pregnancy test done today which was negative  - if bleeding persists for a few weeks, plan TVUS    #cervical cancer screen  - last pap smear: 2024 NILM HPV neg  - next due: 2029 or sooner     #Contraception counseling  - discussed with patient options for contraception including OCP, Nuvaring, Depo Provera, LARC (Nexplanon versus IUD), permanent sterilization  - risks, benefits and alternatives discussed   - pt expressed interest in OCP  - OCP contraindications: pt denies personal or family history of DVT/PE, not breastfeeding and <6 wks postpartum, liver issues, migraines with aura, HTN, smoking   - discussed with pt ACOG recommendation for 18 month inter-pregnancy interval     Return of care in 6 months - 1 year for well woman exam    Gavi Miranda MD   [facility] - OBGYN    Note to patient and family:  The  Century Cures Act makes medical notes available to patients in the interest of transparency.  However, please be advised that this is a medical document.  It is intended as a peer to peer communication.  It is written in medical language and may contain abbreviations or verbiage that are technical and unfamiliar.  It may  appear blunt or direct.  Medical documents are intended to carry relevant information, facts as evident, and the clinical opinion of the practitioner.         [1]   Current Outpatient Medications on File Prior to Visit   Medication Sig Dispense Refill    ibuprofen 600 MG Oral Tab Take 1 tablet (600 mg total) by mouth every 6 (six) hours. 30 tablet 0    NIFEdipine ER 30 MG Oral Tablet 24 Hr Take 1 tablet (30 mg total) by mouth daily. Hold for blood pressure less than 110/70 (Patient not taking: Reported on 2025) 30 tablet 0    Loratadine (CLARITIN OR)       ferrous sulfate 325 (65 FE) MG Oral Tab EC Take 1 tablet (325 mg total) by mouth every other day.      prenatal vitamin with DHA 27-0.8-228 MG Oral Cap Take 1 capsule by mouth in the morning. -Gummy.      busPIRone 5 MG Oral Tab Take 1 tablet (5 mg total) by mouth daily with breakfast. (Patient not taking: Reported on 2025) 30 tablet 1     No current facility-administered medications on file prior to visit.   [2]   Past Medical History:   Allergic rhinitis    Anxiety    ASCUS of cervix with negative high risk HPV    COVID-19 virus infection    Depression    Depressive disorder in remission    PMDD (premenstrual dysphoric disorder)    Pre-eclampsia, severe, third trimester (HCC)    Vasovagal episode    Vasovagal syncope    at the eye doctor   [3]   Allergies  Allergen Reactions    Avocado RASH and ITCHING    Dust UNKNOWN    Ragweed UNKNOWN   [4]   Past Surgical History:  Procedure Laterality Date      2013    [place], [place]          Screening pap smear by phys  2016   [5]   Social History  Socioeconomic History    Marital status:    Tobacco Use    Smoking status: Former     Types: Cigarettes     Start date: 2024     Quit date:      Years since quitting: 3.5    Smokeless tobacco: Former   Vaping Use    Vaping status: Never Used   Substance and Sexual Activity    Alcohol use: Not Currently     Comment: special  occasions    Drug use: No    Sexual activity: Not Currently     Partners: Male   Other Topics Concern     Service No    Blood Transfusions No    Caffeine Concern Yes     Comment: 1 cup of coffee and 1 can of soda daily    Occupational Exposure No    Hobby Hazards No    Sleep Concern No    Stress Concern No    Weight Concern No    Special Diet No    Back Care No    Exercise Yes     Comment: active at work    Bike Helmet No    Seat Belt Yes    Self-Exams No   [6]   Family History  Problem Relation Age of Onset    High Cholesterol Mother     Hypertension Father     Heart Attack Father         lates 40's - stent    Heart Surgery Father     No Known Problems Sister     No Known Problems Sister     No Known Problems Sister     Cancer Maternal Grandmother         Lung    Colon Cancer Maternal Grandfather     Diabetes Maternal Grandfather     Cancer Paternal Grandfather         Lung    No Known Problems Daughter     No Known Problems Son     No Known Problems Son     Breast Cancer Maternal Aunt 50        50s    Breast Cancer Paternal Aunt 50        50s

## (undated) DIAGNOSIS — F32.81 PMDD (PREMENSTRUAL DYSPHORIC DISORDER): ICD-10-CM

## (undated) NOTE — LETTER
11/22/19      Denisha Roberts  1/14/1985        To Whom it may concern:     The above patient was seen at the [facility] for treatment of a medical condition.     This patient was in the office in September for complete physical and was found to

## (undated) NOTE — MR AVS SNAPSHOT
After Visit Summary   9/21/2019    Denisha Chase    MRN: [id number]           Visit Information     Date & Time  9/21/2019  8:00 AM Provider  Yury Mckay PA-C [address] [facility].  Phone  [phone] THINPREP PAP SMEAR ONLY [[id number] CUSTOM]  9/21/2019 9/21/2020    TSH+FREE T4 [[id number] CUSTOM]  9/21/2019 (Approximate) 9/21/2020      Instructions    Routine Healthcare for Women   Routine checkups can find treatable problems early.  For many medical probl increased risk for cervical cancer. Cholesterol test: if you are age 39 or older. You may start having this test at an earlier age if you have a family history of high cholesterol. Colorectal cancer test: if you are 48 or older.  Recommended tests inclu Remember, these are the minimum recommendations for routine tests. You and your healthcare provider must discuss what is right for you based on your symptoms and your personal and family medical history.    Many other tests are often done at routine checkup sexually transmitted infection, abuse IV drugs, or plan to travel where hepatitis B is common. Pneumococcal pneumonia shot if you are age 72 or older. You may need to get it at a younger age if you have a high-risk medical condition, such as diabetes. Osteoporosis prevention:  Advise 1,500 mg of calcium with 1,000 iu of vitamin D daily and daily weight bearing exercise. September 23, 2019      [address] [facility]  [place] [address]     Dear Luther Mcneill :     Thank you for enrolling in [facility] patients. Video Visits are available Monday - Friday for many common conditions such as allergies, colds, cough, fever, rash, sore throat, headache and pink eye.   The cost for a Video Visit is currently $35.         If you receive a survey from [employer] *Cost varies based on your insurance coverage  For more information about hours, locations or appointment options available at [facility],  visit: [facility] [email or web address] or call [phone]. ([phone])

## (undated) NOTE — LETTER
10/15/18        [address] [facility]      Dear Alee Henriquez,    [address] [facility] records indicate that you have outstanding lab work and or testing that was ordered for you and has not yet been completed:      cbc   cmp   lipid  TSH and Free T4 [E]

## (undated) NOTE — MR AVS SNAPSHOT
[facility]  [phone]               Thank you for choosing us for your health care visit with Fly Frazeir PA-C.   We are glad to serve you and happy to provide you with [facility]  The pelvic floor muscles may weaken due to aging, pregnancy and vaginal childbirth, injury, surgery, chronic cough, or lack of exercise. If the pelvic floor is weak, your bladder and other pelvic organs may sag out of place.  The urethra may also open too substitute for professional medical care. Always follow your healthcare professional's instructions. Vaginal Infection: Yeast (Candidiasis)  Yeast infection occurs when yeast in the vagina increase and start attacking the vaginal tissues.  Yeast is a substitute for professional medical care. Always follow your healthcare professional's instructions.              Allergies as of Mar 21, 2017     No Known Allergies                Today's Vital Signs     BP Pulse Temp Height Weight BMI    118/80 mmHg 84 98

## (undated) NOTE — Clinical Note
04/20/2017        [address] [facility]      Dear Elenita Farias,    [address] [facility] records indicate that you have outstanding lab work and or testing that was ordered for you and has not yet been completed:      CBC W Differential W Platelet [E]  Vit

## (undated) NOTE — MR AVS SNAPSHOT
[facility]  [phone]               Thank you for choosing us for your health care visit with Azucena Ramos PA-C.   We are glad to serve you and happy to provide you with [facility] What side effects may I notice from receiving this medicine?   Side effects that you should report to your doctor or health care professional as soon as possible:  · breast tenderness or discharge  · pain in the abdomen, chest, groin or leg  · severe headac Store at room temperature between 15 and 30 degrees C (59 and 86 degrees F). Throw away any unused medicine after the expiration date. What should I tell my health care provider before I take this medicine?   They need to know if you have any of these cond choose, you and your partner must use it the right way each time you have sex. Some of the most common types are described below. Condom  A condom is a thin covering that fits over the penis.  (The female condom fits inside the vagina.) A condom catches sp Emergency contraception can help prevent pregnancy after unprotected sex. Hormone pills (“morning after pills”) are available over the counter to anyone. A second type of EC, a copper IUD, needs to be inserted by a trained healthcare provider.  Either type Sign up for [email or web address], your secure online medical record. [employer] will allow you to access patient instructions from your recent visit,  view other health information, and more. To sign up or find more information, go to [email or web address] and cl

## (undated) NOTE — MR AVS SNAPSHOT
[facility]  [phone]               Thank you for choosing us for your health care visit with Rose Garcia PA-C.   We are glad to serve you and happy to provide you with [facility] Clinical breast exam by your provider: at least every 3 years if you are 21to 44years old and every year if you are 36 years or older   Mammogram: as often as your healthcare provider recommends if you are 22years old or older.  When you should start hav if you have a high risk of sexually transmitted disease (STD)   Gonorrhea and syphilis tests: if you are at high risk for these infections, including if you have a new sex partner or more than 1 partner, a history of STDs, a partner with an STD, or a partn babies are most susceptible to complications from whooping cough, Tdap is especially recommended for adults caring for children, even if it has been less than 10 years since your last tetanus booster.    Flu shot every fall if you are 50 or older, you have grains, fruits, and vegetables in your diet. Get regular physical activity or exercise. Injury prevention: Use lap and shoulder belts when you drive. Use a helmet when you ride a motorcycle or bicycle.  If you are around guns or other firearms, practice s Now link in the [employer]. Enter your Blued Activation Code exactly as it appears below along with your Zip Code and Date of Birth to complete the sign-up process. If you do not sign up before the expiration date, you must request a new code.     Toni Walker

## (undated) NOTE — MR AVS SNAPSHOT
[facility]  [address] [id number] [phone]               Thank you for choosing us for your health care visit with ORTEGA Lazo. We are glad to serve you and happy to provide you with this summary of your visit.   Please h eyelids. In children ages 3 through 8, skin folds, such as the backs of the knees, or in the arm crease, are most often affected. In children 6 and older and in adults, symptoms can affect multiple areas. What triggers symptoms?   Atopic dermatitis sympt Enter your [employer] Activation Code exactly as it appears below along with your Zip Code and Date of Birth to complete the sign-up process. If you do not sign up before the expiration date, you must request a new code.     Your unique iRe[employer] Access Code: Shari Mchugh

## (undated) NOTE — LETTER
Date: 9/26/2017    Patient Name: Bertram Julian          To Whom it may concern: The above patient was seen at the [facility] for treatment of a medical condition.     Patient was in for physical exam on 9/26/17 and was found to be physicall

## (undated) NOTE — LETTER
Date: 9/15/2018    Patient Name: Amanda Colon          To Whom it may concern: The above patient was seen at the [facility] for treatment of a medical condition.     This patient was in the office today for complete physical and was found

## (undated) NOTE — LETTER
July 20, 2020    [address] [facility]      Dear Luther Mcneill: The following are the results of your recent tests. Please review the list of test results.   Your result is the value on the left; we have also supplied the range of reymundo